# Patient Record
Sex: FEMALE | Race: WHITE | NOT HISPANIC OR LATINO | Employment: PART TIME | URBAN - METROPOLITAN AREA
[De-identification: names, ages, dates, MRNs, and addresses within clinical notes are randomized per-mention and may not be internally consistent; named-entity substitution may affect disease eponyms.]

---

## 2017-09-30 ENCOUNTER — HOSPITAL ENCOUNTER (EMERGENCY)
Facility: CLINIC | Age: 37
Discharge: HOME | End: 2017-10-01
Attending: EMERGENCY MEDICINE | Admitting: EMERGENCY MEDICINE
Payer: COMMERCIAL

## 2017-09-30 ENCOUNTER — APPOINTMENT (OUTPATIENT)
Dept: CT IMAGING | Facility: CLINIC | Age: 37
End: 2017-09-30
Attending: EMERGENCY MEDICINE
Payer: COMMERCIAL

## 2017-09-30 VITALS
OXYGEN SATURATION: 94 % | HEIGHT: 67 IN | RESPIRATION RATE: 18 BRPM | WEIGHT: 170 LBS | TEMPERATURE: 98.5 F | BODY MASS INDEX: 26.68 KG/M2 | SYSTOLIC BLOOD PRESSURE: 132 MMHG | DIASTOLIC BLOOD PRESSURE: 83 MMHG

## 2017-09-30 DIAGNOSIS — G43.819 OTHER MIGRAINE WITHOUT STATUS MIGRAINOSUS, INTRACTABLE: ICD-10-CM

## 2017-09-30 DIAGNOSIS — S16.1XXA CERVICAL STRAIN, INITIAL ENCOUNTER: ICD-10-CM

## 2017-09-30 PROBLEM — J45.909 ASTHMA: Status: ACTIVE | Noted: 2017-09-30

## 2017-09-30 PROBLEM — K59.03 CONSTIPATION DUE TO PAIN MEDICATION: Status: ACTIVE | Noted: 2017-09-30

## 2017-09-30 PROBLEM — F10.10 ALCOHOL ABUSE, EPISODIC: Status: ACTIVE | Noted: 2017-09-30

## 2017-09-30 PROBLEM — D72.829 LEUKOCYTOSIS: Status: ACTIVE | Noted: 2017-09-30

## 2017-09-30 PROBLEM — K86.1 CHRONIC PANCREATITIS (H): Status: ACTIVE | Noted: 2017-09-30

## 2017-09-30 PROBLEM — K85.20 ALCOHOL INDUCED ACUTE PANCREATITIS: Status: ACTIVE | Noted: 2017-04-26

## 2017-09-30 PROBLEM — M79.7 FIBROMYALGIA: Status: ACTIVE | Noted: 2017-04-26

## 2017-09-30 PROBLEM — F41.9 ANXIETY: Status: ACTIVE | Noted: 2017-04-26

## 2017-09-30 PROCEDURE — 25000128 H RX IP 250 OP 636

## 2017-09-30 PROCEDURE — 99284 EMERGENCY DEPT VISIT MOD MDM: CPT | Mod: 25

## 2017-09-30 PROCEDURE — 72125 CT NECK SPINE W/O DYE: CPT

## 2017-09-30 PROCEDURE — 96375 TX/PRO/DX INJ NEW DRUG ADDON: CPT

## 2017-09-30 PROCEDURE — 70450 CT HEAD/BRAIN W/O DYE: CPT

## 2017-09-30 PROCEDURE — 99284 EMERGENCY DEPT VISIT MOD MDM: CPT | Performed by: EMERGENCY MEDICINE

## 2017-09-30 PROCEDURE — 96361 HYDRATE IV INFUSION ADD-ON: CPT | Mod: 59

## 2017-09-30 PROCEDURE — 96374 THER/PROPH/DIAG INJ IV PUSH: CPT

## 2017-09-30 PROCEDURE — 25000128 H RX IP 250 OP 636: Performed by: EMERGENCY MEDICINE

## 2017-09-30 RX ORDER — ONDANSETRON 2 MG/ML
INJECTION INTRAMUSCULAR; INTRAVENOUS
Status: COMPLETED
Start: 2017-09-30 | End: 2017-09-30

## 2017-09-30 RX ORDER — KETOROLAC TROMETHAMINE 30 MG/ML
30 INJECTION, SOLUTION INTRAMUSCULAR; INTRAVENOUS ONCE
Status: COMPLETED | OUTPATIENT
Start: 2017-09-30 | End: 2017-09-30

## 2017-09-30 RX ORDER — ONDANSETRON 2 MG/ML
4 INJECTION INTRAMUSCULAR; INTRAVENOUS ONCE
Status: COMPLETED | OUTPATIENT
Start: 2017-09-30 | End: 2017-09-30

## 2017-09-30 RX ORDER — SODIUM CHLORIDE 9 MG/ML
1000 INJECTION, SOLUTION INTRAVENOUS CONTINUOUS
Status: DISCONTINUED | OUTPATIENT
Start: 2017-09-30 | End: 2017-10-01 | Stop reason: HOSPADM

## 2017-09-30 RX ORDER — DIPHENHYDRAMINE HYDROCHLORIDE 50 MG/ML
25 INJECTION INTRAMUSCULAR; INTRAVENOUS ONCE
Status: COMPLETED | OUTPATIENT
Start: 2017-09-30 | End: 2017-09-30

## 2017-09-30 RX ORDER — METOCLOPRAMIDE HYDROCHLORIDE 5 MG/ML
10 INJECTION INTRAMUSCULAR; INTRAVENOUS ONCE
Status: COMPLETED | OUTPATIENT
Start: 2017-09-30 | End: 2017-09-30

## 2017-09-30 RX ADMIN — DIPHENHYDRAMINE HYDROCHLORIDE 25 MG: 50 INJECTION, SOLUTION INTRAMUSCULAR; INTRAVENOUS at 22:34

## 2017-09-30 RX ADMIN — ONDANSETRON 4 MG: 2 INJECTION INTRAMUSCULAR; INTRAVENOUS at 22:34

## 2017-09-30 RX ADMIN — SODIUM CHLORIDE 1000 ML: 9 INJECTION, SOLUTION INTRAVENOUS at 22:33

## 2017-09-30 RX ADMIN — KETOROLAC TROMETHAMINE 30 MG: 30 INJECTION, SOLUTION INTRAMUSCULAR at 22:34

## 2017-09-30 RX ADMIN — METOCLOPRAMIDE 10 MG: 5 INJECTION, SOLUTION INTRAMUSCULAR; INTRAVENOUS at 22:34

## 2017-09-30 NOTE — ED AVS SNAPSHOT
[facility] Emergency Department    [address] [address]    Phone:  [phone]    Fax:  [phone]                                       Janel Calzada   MRN: [id number]    Department:  [facility] Emergency Department   Date of Visit:  9/30/2017           After Visit Summary Signature Page     I have received my discharge instructions, and my questions have been answered. I have discussed any challenges I see with this plan with the nurse or doctor.    ..........................................................................................................................................  Patient/Patient Representative Signature      ..........................................................................................................................................  Patient Representative Print Name and Relationship to Patient    ..................................................               ................................................  Date                                            Time    ..........................................................................................................................................  Reviewed by Signature/Title    ...................................................              ..............................................  Date                                                            Time
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
## 2017-10-01 NOTE — DISCHARGE INSTRUCTIONS
Understanding Cervical Strain    There are 7 bones (vertebrae) in the neck that are part of the spine. These are called the cervical spine. Cervical strain is a medical term for neck pain. The neck has several layers of muscles. These are connected with tendons to the cervical spine and other bones. Neck pain is often the result of injury to these muscles and tendons.  Causes of cervical strain  Different types of stress on the neck can damage muscles and tendons (soft tissues) and cause cervical strain. Cervical tissues can be damaged by:    The neck being forced past its normal range of motion, such as in a car accident or sports injury    Constant, low-level stress, such as from poor posture or a poorly set-up workspace  Symptoms of cervical strain  These may include:    Neck pain or stiffness    Pain in the shoulders or upper back    Muscle spasms    Headache, often starting at the base of the neck    Irritability, difficulty concentrating, or sleeplessness  Treatment for cervical strain  This problem often gets better on its own. Treatments aim to reduce pain and inflammation and increase the range of motion of the neck. Possible treatments include:    Over-the-counter or prescription pain medicine. These help relieve pain and inflammation.    Stretching exercises to decrease neck stiffness.    Massage to decrease neck stiffness.    Cold or heat pack. These help reduce pain and swelling.  Call 911  Call emergency services right away if you have any of these:    Face drooping or numbness    Numbness or weakness, especially in the arms or on one side    Slurred speech or difficulty speaking    Blurred vision   When to call your healthcare provider  Call your healthcare provider right away if you have any of these:    Fever of 100.4 F (38 C) or higher, or as directed    Pain or stiffness that gets worse    Symptoms that don t get better, or get worse    Numbness, tingling, weakness or shooting pains into the  arms or legs    New symptoms  Date Last Reviewed: 3/10/2016    3874-2962 [employer]. [address] [place] [address], [place] [address]. All rights reserved. This information is not intended as a substitute for professional medical care. Always follow your healthcare professional's instructions.          * Migraine Headache  Migraine headaches are related to changes in blood flow to the brain. This causes throbbing or constant pain on one or both sides of the head. The pain may last from a few hours to several days. There is usually nausea, vomiting, sensitivity to light and sound, and blurred vision. A migraine attack may be triggered by emotional stress, hormone changes during the menstrual cycle, oral contraceptives, alcohol use, certain foods containing tyramine, eye strain, weather changes, missing meals, or too little or too much sleep.  Home Care For This Headache:  1) If you were given pain medicine for this headache, do not drive yourself home . Arrange for a ride, instead. When you get home, try to sleep. You should feel much better when you wake up.  2) Migraine headaches may improve with an ice pack on the forehead or at the base of the skull. Heat to the back of your neck may relieve any neck spasm.  3) Drink only clear liquids or eat a very light diet to avoid nausea/vomiting until symptoms improve.  Preventing Future Headaches:  1) Pay attention to those factors that seem to trigger your headache. Try to avoid them when you can. If you have frequent headaches, it is useful to keep a diary of what you were doing, feeling or eating in the hours before each attack. Show this to your doctor to help find the cause of your headaches.  a) If you feel that stress is a factor in your headaches, look at the sources of stress in your life. Find ways to release the build-up of those stresses by using regular exercise, relaxation methods (yoga, meditation), bio-feedback or simply taking time-out for  yourself. For more information about this, consult your doctor or go to a local bookstore and review books and tapes on this subject.  b) Tyramine is a substance present in the following foods : chocolate, yogurt, all cheeses except cottage cheese and cream cheese. smoked or pickled fish and meat (including herring, caviar, bologna, pepperoni, salami), liver, avocados, bananas, figs, raisins, and red wine. Be aware that these foods may trigger a migraine in some persons. Try taking these foods out of your diet for 1-2 months to see if this reduces headache frequency.  Treating Future Attacks:  1) At the first sign of a migraine headache, take a medicine to stop it if one has been prescribed for you. If not, take acetaminophen (Tylenol) or ibuprofen (Motrin, Advil) if you are able to take these. The sooner you take medicine, the better it will work.  2) You may also want to find a quiet, dark, comfortable place to sit or lie down. Let yourself relax or sleep.  3) An ice pack on the forehead or area of greatest pain may also help.   Follow Up  with your doctor if the headache is not better within the next 24 hours. If you have frequent headaches you should discuss a treatment plan with your primary care doctor. Ask if you can have medicine to take at home the next time you get a bad headache. Poorly controlled chronic headaches may require a referral to a neurologist (headache specialist).  Get Prompt Medical Attention  if any of the following occur:    Your head pain gets worse, or does not improve within 24 hours    Repeated vomiting (can t keep liquids down)    Sinus or ear or throat pain (not already reported)    Fever of 101  F (38.3  C) or higher, or as directed by your healthcare provider    Stiff neck    Extreme drowsiness, confusion or fainting    Weakness of an arm or leg or one side of the face    Difficulty with speech or vision    8593-7308 [employer]. [address] [place] [address], [place]  [address]. All rights reserved. This information is not intended as a substitute for professional medical care. Always follow your healthcare professional's instructions.

## 2017-12-08 ENCOUNTER — AMBULATORY - HEALTHEAST (OUTPATIENT)
Dept: PALLIATIVE MEDICINE | Facility: OTHER | Age: 37
End: 2017-12-08

## 2017-12-08 DIAGNOSIS — G89.4 CHRONIC PAIN DISORDER: ICD-10-CM

## 2018-07-25 ENCOUNTER — APPOINTMENT (OUTPATIENT)
Dept: ULTRASOUND IMAGING | Facility: CLINIC | Age: 38
End: 2018-07-25
Attending: EMERGENCY MEDICINE
Payer: COMMERCIAL

## 2018-07-25 ENCOUNTER — HOSPITAL ENCOUNTER (EMERGENCY)
Facility: CLINIC | Age: 38
Discharge: HOME | End: 2018-07-25
Attending: EMERGENCY MEDICINE | Admitting: EMERGENCY MEDICINE
Payer: COMMERCIAL

## 2018-07-25 VITALS
RESPIRATION RATE: 16 BRPM | BODY MASS INDEX: 25.11 KG/M2 | SYSTOLIC BLOOD PRESSURE: 101 MMHG | HEART RATE: 62 BPM | OXYGEN SATURATION: 98 % | WEIGHT: 160 LBS | HEIGHT: 67 IN | DIASTOLIC BLOOD PRESSURE: 68 MMHG | TEMPERATURE: 99 F

## 2018-07-25 DIAGNOSIS — R10.30 ABDOMINAL PAIN, LOWER: ICD-10-CM

## 2018-07-25 DIAGNOSIS — N83.201 HEMORRHAGIC CYST OF RIGHT OVARY: ICD-10-CM

## 2018-07-25 LAB
ALBUMIN SERPL-MCNC: 4 G/DL (ref 3.4–5)
ALBUMIN UR-MCNC: NEGATIVE MG/DL
ALP SERPL-CCNC: 55 U/L (ref 40–150)
ALT SERPL W P-5'-P-CCNC: 27 U/L (ref 0–50)
ANION GAP SERPL CALCULATED.3IONS-SCNC: 4 MMOL/L (ref 3–14)
APPEARANCE UR: CLEAR
AST SERPL W P-5'-P-CCNC: 13 U/L (ref 0–45)
BASOPHILS # BLD AUTO: 0 10E9/L (ref 0–0.2)
BASOPHILS NFR BLD AUTO: 0.3 %
BILIRUB SERPL-MCNC: 0.3 MG/DL (ref 0.2–1.3)
BILIRUB UR QL STRIP: NEGATIVE
BUN SERPL-MCNC: 9 MG/DL (ref 7–30)
CALCIUM SERPL-MCNC: 9.1 MG/DL (ref 8.5–10.1)
CHLORIDE SERPL-SCNC: 108 MMOL/L (ref 94–109)
CO2 SERPL-SCNC: 26 MMOL/L (ref 20–32)
COLOR UR AUTO: YELLOW
CREAT SERPL-MCNC: 0.76 MG/DL (ref 0.52–1.04)
DIFFERENTIAL METHOD BLD: NORMAL
EOSINOPHIL # BLD AUTO: 0.1 10E9/L (ref 0–0.7)
EOSINOPHIL NFR BLD AUTO: 0.9 %
ERYTHROCYTE [DISTWIDTH] IN BLOOD BY AUTOMATED COUNT: 12.6 % (ref 10–15)
GFR SERPL CREATININE-BSD FRML MDRD: 86 ML/MIN/1.7M2
GLUCOSE SERPL-MCNC: 94 MG/DL (ref 70–99)
GLUCOSE UR STRIP-MCNC: NEGATIVE MG/DL
HCG SERPL QL: NEGATIVE
HCT VFR BLD AUTO: 38.6 % (ref 35–47)
HGB BLD-MCNC: 13.3 G/DL (ref 11.7–15.7)
HGB UR QL STRIP: NEGATIVE
IMM GRANULOCYTES # BLD: 0 10E9/L (ref 0–0.4)
IMM GRANULOCYTES NFR BLD: 0.2 %
KETONES UR STRIP-MCNC: NEGATIVE MG/DL
LEUKOCYTE ESTERASE UR QL STRIP: NEGATIVE
LYMPHOCYTES # BLD AUTO: 3.4 10E9/L (ref 0.8–5.3)
LYMPHOCYTES NFR BLD AUTO: 34.6 %
MCH RBC QN AUTO: 31.8 PG (ref 26.5–33)
MCHC RBC AUTO-ENTMCNC: 34.5 G/DL (ref 31.5–36.5)
MCV RBC AUTO: 92 FL (ref 78–100)
MONOCYTES # BLD AUTO: 0.7 10E9/L (ref 0–1.3)
MONOCYTES NFR BLD AUTO: 7.1 %
MUCOUS THREADS #/AREA URNS LPF: PRESENT /LPF
NEUTROPHILS # BLD AUTO: 5.6 10E9/L (ref 1.6–8.3)
NEUTROPHILS NFR BLD AUTO: 56.9 %
NITRATE UR QL: NEGATIVE
NRBC # BLD AUTO: 0 10*3/UL
NRBC BLD AUTO-RTO: 0 /100
PH UR STRIP: 6 PH (ref 5–7)
PLATELET # BLD AUTO: 235 10E9/L (ref 150–450)
POTASSIUM SERPL-SCNC: 4 MMOL/L (ref 3.4–5.3)
PROT SERPL-MCNC: 7.2 G/DL (ref 6.8–8.8)
RBC # BLD AUTO: 4.18 10E12/L (ref 3.8–5.2)
RBC #/AREA URNS AUTO: <1 /HPF (ref 0–2)
SODIUM SERPL-SCNC: 138 MMOL/L (ref 133–144)
SOURCE: ABNORMAL
SP GR UR STRIP: 1.01 (ref 1–1.03)
SQUAMOUS #/AREA URNS AUTO: <1 /HPF (ref 0–1)
UROBILINOGEN UR STRIP-MCNC: 0 MG/DL (ref 0–2)
WBC # BLD AUTO: 9.8 10E9/L (ref 4–11)
WBC #/AREA URNS AUTO: <1 /HPF (ref 0–5)

## 2018-07-25 PROCEDURE — 99285 EMERGENCY DEPT VISIT HI MDM: CPT | Mod: Z6 | Performed by: EMERGENCY MEDICINE

## 2018-07-25 PROCEDURE — 93976 VASCULAR STUDY: CPT

## 2018-07-25 PROCEDURE — 84703 CHORIONIC GONADOTROPIN ASSAY: CPT | Performed by: EMERGENCY MEDICINE

## 2018-07-25 PROCEDURE — 99285 EMERGENCY DEPT VISIT HI MDM: CPT | Mod: 25 | Performed by: EMERGENCY MEDICINE

## 2018-07-25 PROCEDURE — 96374 THER/PROPH/DIAG INJ IV PUSH: CPT | Performed by: EMERGENCY MEDICINE

## 2018-07-25 PROCEDURE — 81001 URINALYSIS AUTO W/SCOPE: CPT | Performed by: EMERGENCY MEDICINE

## 2018-07-25 PROCEDURE — 85025 COMPLETE CBC W/AUTO DIFF WBC: CPT | Performed by: EMERGENCY MEDICINE

## 2018-07-25 PROCEDURE — 25000128 H RX IP 250 OP 636: Performed by: EMERGENCY MEDICINE

## 2018-07-25 PROCEDURE — 80053 COMPREHEN METABOLIC PANEL: CPT | Performed by: EMERGENCY MEDICINE

## 2018-07-25 PROCEDURE — 96375 TX/PRO/DX INJ NEW DRUG ADDON: CPT | Performed by: EMERGENCY MEDICINE

## 2018-07-25 PROCEDURE — 25000132 ZZH RX MED GY IP 250 OP 250 PS 637: Performed by: EMERGENCY MEDICINE

## 2018-07-25 RX ORDER — OXYCODONE AND ACETAMINOPHEN 5; 325 MG/1; MG/1
2 TABLET ORAL ONCE
Status: COMPLETED | OUTPATIENT
Start: 2018-07-25 | End: 2018-07-25

## 2018-07-25 RX ORDER — KETOROLAC TROMETHAMINE 30 MG/ML
30 INJECTION, SOLUTION INTRAMUSCULAR; INTRAVENOUS ONCE
Status: COMPLETED | OUTPATIENT
Start: 2018-07-25 | End: 2018-07-25

## 2018-07-25 RX ORDER — OXYCODONE AND ACETAMINOPHEN 5; 325 MG/1; MG/1
1-2 TABLET ORAL EVERY 4 HOURS PRN
Qty: 12 TABLET | Refills: 0 | Status: SHIPPED | OUTPATIENT
Start: 2018-07-25 | End: 2019-07-26

## 2018-07-25 RX ORDER — HYDROMORPHONE HYDROCHLORIDE 1 MG/ML
0.5 INJECTION, SOLUTION INTRAMUSCULAR; INTRAVENOUS; SUBCUTANEOUS ONCE
Status: COMPLETED | OUTPATIENT
Start: 2018-07-25 | End: 2018-07-25

## 2018-07-25 RX ADMIN — KETOROLAC TROMETHAMINE 30 MG: 30 INJECTION, SOLUTION INTRAMUSCULAR at 19:17

## 2018-07-25 RX ADMIN — OXYCODONE HYDROCHLORIDE AND ACETAMINOPHEN 2 TABLET: 5; 325 TABLET ORAL at 21:23

## 2018-07-25 RX ADMIN — Medication 0.5 MG: at 19:38

## 2018-07-25 NOTE — ED AVS SNAPSHOT
[facility]    [address] [facility] [place] [place] [address]    Phone:  [phone]    Fax:  [phone]                                       Janel Calzada   MRN: [id number]    Department:  [facility]   Date of Visit:  7/25/2018           Patient Information     Date Of Birth          1980        Your diagnoses for this visit were:     Hemorrhagic cyst of right ovary     Abdominal pain, lower        You were seen by Aric Marie MD.      Follow-up Information     Schedule an appointment as soon as possible for a visit with Robina Borrero.    Specialty:  Family Practice    Why:  For re-check later this week    Contact information:    [facility]  [address] [facility] [address]  [phone]          Discharge Instructions         Understanding Ovarian Cysts  An ovarian cyst is a fluid-filled sac that forms on or inside an ovary. The ovaries are a pair of small, oval-shaped organs in the lower part of a woman s belly (abdomen). About once a month, one of the ovaries releases an egg. The ovaries also make the hormones estrogen and progesterone. These hormones are part of pregnancy, the menstrual cycle, and breast growth.  Ovarian cysts are very common in women of all ages. Young girls can also get them, but this is less common. There are different types of ovarian cysts. They can occur for various reasons, and they may need different treatments. A cyst can vary in size from half an inch to more than 4 inches.  Types of ovarian cysts  There are different types of ovarian cysts:  Functional cyst  This is the most common type of ovarian cyst. They only occur in women who haven t gone through menopause. There are 2 types of functional cysts:    Follicular cyst. This cyst happens when an egg isn t released and it keeps growing inside the ovary.    Corpus luteum cyst. This type of cyst occurs when the sac around the egg doesn t dissolve  after the egg is released.  Endometrioma  This is a cyst filled with old blood and tissue from the lining of the uterus. They are often called chocolate cysts because of their dark color. They can happen in women with endometriosis.  Dermoid cyst  This cyst develops from ovarian cells and eggs. They may have hair, skin, or fat in them. These cysts are common in women of childbearing age.  What causes ovarian cysts?  Cysts can also be caused by:    Polycystic ovary syndrome (PCOS), a condition that causes multiple cysts on the ovaries    Pregnancy    Severe pelvic infection, such as chlamydia    Noncancerous growths    Cancer (rare)    Using fertility medicine to cause ovulation, such as clomiphene  Symptoms of an ovarian cyst  Many women don t have any symptoms from the cyst. In women with symptoms, the most common is pain or pressure in your lower belly on the side of the cyst. This pain may be dull or sharp, and it may come and go. A cyst that breaks open (ruptures) may lead to sudden, sharp pain.  Other symptoms of an ovarian cyst can include:    Pain in the lower back or thighs    Trouble emptying your bladder fully    Pain during sex    Weight gain    Pain during your period    Breast tenderness    Abnormal vaginal bleeding (rare)  Diagnosing an ovarian cyst  Your primary care doctor or an obstetrics and gynecology (OB/GYN) doctor may diagnose the condition. Your doctor will ask about your health history and your symptoms. You will also have a physical exam. This will likely include a pelvic exam. During the pelvic exam, your doctor may feel the swelling on your ovary. In women with no symptoms, this is often the first sign of a cyst.  If your doctor thinks you may have an ovarian cyst, you may need tests. These can help your doctor learn the type of cyst. Tests can also help rule out other problems, such as an ectopic pregnancy. The tests may include:    Ultrasound. This test uses sound waves to view the size,  shape, and location of the cyst. The test can also show if the growth is solid or filled with fluid.    MRI. This uses large magnets and a computer to create a detailed picture of the area.    Pregnancy test. This is done to check if pregnancy may be the cause of the cyst.    Blood tests. These check for hormone problems and cancer. They also check if the cyst is bleeding.    Biopsy. This is a test where a tiny piece of the ovary is taken. The piece is examined in a lab for cancer cells. This may be done if an ultrasound shows a certain type of growth on the ovary.  Date Last Reviewed: 8/1/2017 2000-2017 [employer]. [address] [place] [address]. All rights reserved. This information is not intended as a substitute for professional medical care. Always follow your healthcare professional's instructions.          Ovarian Cysts    The ovaries are two small organs located on each side of a woman s uterus (womb). They are part of the female reproductive system. Ovarian cysts are sacs filled with fluid or tissue that form on or inside the ovaries.  Ovarian cysts are common in women, especially during childbearing years. There are different types of cysts. Most are harmless (benign) and go away on their own. They often cause no symptoms. If symptoms do occur, they can include mild pain or pressure in the lower belly (abdomen).  Cysts that are large or break (rupture) may cause more severe pain and symptoms. In these cases, you may need hospital care or treatment such as surgery. You may need more extensive treatment if a cyst causes an ovary to twist (called torsion) or if your doctor suspects your cyst is cancerous. Keep in mind that most cysts are not cancerous, however.  General care    To help relieve pain, your healthcare provider may recommend using over-the-counter pain medicine. If needed, your provide may prescribe stronger pain medicine.    Depending on the type of cyst you have,  your healthcare provider may advise taking birth control pills. These help shrink cysts in certain cases. They may also help prevent new cysts from forming. Be sure to take these medicines as directed if they are prescribed.    Your healthcare provider may advise you to watch your symptoms over time to see if they go away or worsen. Regular ultrasound tests may also be advised. These can help check if a cyst goes away or grows in size.  Follow-up care  Follow up with your healthcare provider, or as advised.  When to seek medical advice  Call your healthcare provider right away if any of these occur:    Pain worsens or fails to get better with home treatment    Fever of 100.4 F (38 C) or higher (or other fever amount directed by your healthcare provider)    Nausea and vomiting    Weakness, dizziness, or fainting    Abnormal vaginal bleeding  Date Last Reviewed: 11/1/2017 2000-2017 [employer]. [address] [place] [address]. All rights reserved. This information is not intended as a substitute for professional medical care. Always follow your healthcare professional's instructions.          24 Hour Appointment Hotline       To make an appointment at any [facility], call [phone] ([phone]). If you don't have a family doctor or clinic, we will help you find one. [place] clinics are conveniently located to serve the needs of you and your family.             Review of your medicines      START taking        Dose / Directions Last dose taken    oxyCODONE-acetaminophen 5-325 MG per tablet   Commonly known as:  PERCOCET   Dose:  1-2 tablet   Quantity:  12 tablet        Take 1-2 tablets by mouth every 4 hours as needed for moderate to severe pain   Refills:  0          Our records show that you are taking the medicines listed below. If these are incorrect, please call your family doctor or clinic.        Dose / Directions Last dose taken    ADDERALL PO        Refills:  0         GABAPENTIN PO        Refills:  0        PROZAC PO        Refills:  0                Information about OPIOIDS     PRESCRIPTION OPIOIDS: WHAT YOU NEED TO KNOW   We gave you an opioid (narcotic) pain medicine. It is important to manage your pain, but opioids are not always the best choice. You should first try all the other options your care team gave you. Take this medicine for as short a time (and as few doses) as possible.     These medicines have risks:    DO NOT drive when on new or higher doses of pain medicine. These medicines can affect your alertness and reaction times, and you could be arrested for driving under the influence (DUI). If you need to use opioids long-term, talk to your care team about driving.    DO NOT operate heave machinery    DO NOT do any other dangerous activities while taking these medicines.     DO NOT drink any alcohol while taking these medicines.      If the opioid prescribed includes acetaminophen, DO NOT take with any other medicines that contain acetaminophen. Read all labels carefully. Look for the word  acetaminophen  or  Tylenol.  Ask your pharmacist if you have questions or are unsure.    You can get addicted to pain medicines, especially if you have a history of addiction (chemical, alcohol or substance dependence). Talk to your care team about ways to reduce this risk.    Store your pills in a secure place, locked if possible. We will not replace any lost or stolen medicine. If you don t finish your medicine, please throw away (dispose) as directed by your pharmacist. The [place] [employer]ution [employer] Agency has more information about safe disposal: https://www.pca.[facility].[place].us/living-green/managing-unwanted-medications.     All opioids tend to cause constipation. Drink plenty of water and eat foods that have a lot of fiber, such as fruits, vegetables, prune juice, apple juice and high-fiber cereal. Take a laxative (Miralax, milk of magnesia, Colace, Senna) if you don t move  your bowels at least every other day.         Prescriptions were sent or printed at these locations (1 Prescription)                   Other Prescriptions                Printed at Department/Unit printer (1 of 1)         oxyCODONE-acetaminophen (PERCOCET) 5-325 MG per tablet                Procedures and tests performed during your visit     CBC with platelets, differential    Comprehensive metabolic panel    HCG qualitative pregnancy (blood)    Pelvic Ultrasound (US) with doppler imaging (r/o ovarian torsion)    UA with Microscopic      Orders Needing Specimen Collection     None      Pending Results     No orders found from 7/23/2018 to 7/26/2018.            Pending Culture Results     No orders found from 7/23/2018 to 7/26/2018.            Pending Results Instructions     If you had any lab results that were not finalized at the time of your Discharge, you can call the ED Lab Result RN at [phone]. You will be contacted by this team for any positive Lab results or changes in treatment. The nurses are available 7 days a week from 10A to 6:30P.  You can leave a message 24 hours per day and they will return your call.        Test Results From Your Hospital Stay        7/25/2018  7:50 PM      Component Results     Component Value Ref Range & Units Status    Sodium 138 133 - 144 mmol/L Final    Potassium 4.0 3.4 - 5.3 mmol/L Final    Chloride 108 94 - 109 mmol/L Final    Carbon Dioxide 26 20 - 32 mmol/L Final    Anion Gap 4 3 - 14 mmol/L Final    Glucose 94 70 - 99 mg/dL Final    Urea Nitrogen 9 7 - 30 mg/dL Final    Creatinine 0.76 0.52 - 1.04 mg/dL Final    GFR Estimate 86 >60 mL/min/1.7m2 Final    Non  GFR Calc    GFR Estimate If Black >90 >60 mL/min/1.7m2 Final    African American GFR Calc    Calcium 9.1 8.5 - 10.1 mg/dL Final    Bilirubin Total 0.3 0.2 - 1.3 mg/dL Final    Albumin 4.0 3.4 - 5.0 g/dL Final    Protein Total 7.2 6.8 - 8.8 g/dL Final    Alkaline Phosphatase 55 40 - 150 U/L Final     ALT 27 0 - 50 U/L Final    AST 13 0 - 45 U/L Final         7/25/2018  7:33 PM      Component Results     Component Value Ref Range & Units Status    WBC 9.8 4.0 - 11.0 10e9/L Final    RBC Count 4.18 3.8 - 5.2 10e12/L Final    Hemoglobin 13.3 11.7 - 15.7 g/dL Final    Hematocrit 38.6 35.0 - 47.0 % Final    MCV 92 78 - 100 fl Final    MCH 31.8 26.5 - 33.0 pg Final    MCHC 34.5 31.5 - 36.5 g/dL Final    RDW 12.6 10.0 - 15.0 % Final    Platelet Count 235 150 - 450 10e9/L Final    Diff Method Automated Method  Final    % Neutrophils 56.9 % Final    % Lymphocytes 34.6 % Final    % Monocytes 7.1 % Final    % Eosinophils 0.9 % Final    % Basophils 0.3 % Final    % Immature Granulocytes 0.2 % Final    Nucleated RBCs 0 0 /100 Final    Absolute Neutrophil 5.6 1.6 - 8.3 10e9/L Final    Absolute Lymphocytes 3.4 0.8 - 5.3 10e9/L Final    Absolute Monocytes 0.7 0.0 - 1.3 10e9/L Final    Absolute Eosinophils 0.1 0.0 - 0.7 10e9/L Final    Absolute Basophils 0.0 0.0 - 0.2 10e9/L Final    Abs Immature Granulocytes 0.0 0 - 0.4 10e9/L Final    Absolute Nucleated RBC 0.0  Final         7/25/2018  8:39 PM      Component Results     Component Value Ref Range & Units Status    Color Urine Yellow  Final    Appearance Urine Clear  Final    Glucose Urine Negative NEG^Negative mg/dL Final    Bilirubin Urine Negative NEG^Negative Final    Ketones Urine Negative NEG^Negative mg/dL Final    Specific Gravity Urine 1.012 1.003 - 1.035 Final    Blood Urine Negative NEG^Negative Final    pH Urine 6.0 5.0 - 7.0 pH Final    Protein Albumin Urine Negative NEG^Negative mg/dL Final    Urobilinogen mg/dL 0.0 0.0 - 2.0 mg/dL Final    Nitrite Urine Negative NEG^Negative Final    Leukocyte Esterase Urine Negative NEG^Negative Final    Source Midstream Urine  Final    WBC Urine <1 0 - 5 /HPF Final    RBC Urine <1 0 - 2 /HPF Final    Squamous Epithelial /HPF Urine <1 0 - 1 /HPF Final    Mucous Urine Present (A) NEG^Negative /LPF Final         7/25/2018  8:47  PM      Narrative     US PELVIS COMPLETE WITH TRANSVAGINAL AND DOPPLER LIMITED 7/25/2018  8:17 PM     HISTORY: Severe pelvic pain, status post hysterectomy.      FINDINGS: Transvaginal images were performed to better evaluate the  patient's uterus, ovaries and endometrial stripe.    The uterus is surgically absent. The right ovary measures 4.4 x 3.0 x  3.1 cm and contains a complex appearing cystic lesion measuring 3.8 x  2.7 x 2.5 cm. No associated color Doppler flow. This is most likely a  hemorrhagic cyst. The left ovary measures 3.3 x 2.3 x 3.0 cm and  contains a dominant follicle measuring 2.2 cm. Color Doppler waveform  analysis demonstrates normal arterial and venous waveforms within each  ovary. No adnexal masses are present. No free pelvic fluid is present.        Impression     IMPRESSION: No ultrasound findings to suggest ovarian torsion.  Dominant follicle left ovary and probable hemorrhagic cyst right ovary  measuring 3.8 cm. Follow-up ultrasound in 2 or 3 months could be  performed to assess for resolution. No free pelvic fluid. Prior  hysterectomy.    KESHIA HENDERSON MD         7/25/2018  9:13 PM      Component Results     Component Value Ref Range & Units Status    HCG Qualitative Serum Negative NEG^Negative Final    This test is for screening purposes.  Results should be interpreted along with   the clinical picture.  Confirmation testing is available if warranted by   ordering FYA399, HCG Quantitative Pregnancy.                  Thank you for choosing [facility]       Thank you for choosing [facility] for your care. Our goal is always to provide you with excellent care. Hearing back from our patients is one way we can continue to improve our services. Please take a few minutes to complete the written survey that you may receive in the mail after you visit with us. Thank you!        [employer] Information Digital Information     [employer] lets you send messages to your doctor, view your test results, renew your prescriptions,  "schedule appointments and more. To sign up, go to [email or web address] . Click on \"Log in\" on the left side of the screen, which will take you to the Welcome page. Then click on \"Sign up Now\" on the right side of the page.     You will be asked to enter the access code listed below, as well as some personal information. Please follow the directions to create your username and password.     Your access code is: [email or web address]  Expires: 10/23/2018  8:58 PM     Your access code will  in 90 days. If you need help or a new code, please call your [facility] clinic or [phone].        Care EveryWhere ID     This is your Care EveryWhere ID. This could be used by other organizations to access your [facility] medical records  [id number]        Equal Access to Services     JORDAN JANE : Camilo Huynh, renay coelho, maria m valdes, fco truong . So [facility] [phone].    ATENCIÓN: Si habla español, tiene a contreras disposición servicios gratuitos de asistencia lingüística. Llame al [phone].    We comply with applicable federal civil rights laws and [place] laws. We do not discriminate on the basis of race, color, national origin, age, disability, sex, sexual orientation, or gender identity.            After Visit Summary       This is your record. Keep this with you and show to your community pharmacist(s) and doctor(s) at your next visit.                  "

## 2018-07-25 NOTE — ED TRIAGE NOTES
Pt arrives by ambulance with lower abdominal pain that began over one month ago. Hysterectomy 3 years ago. H/O a left ovary cyst that was treated with oral pain medication. Pain is similar but feels much worse, pressure is present..Recieved 25 mcg Fentanyl from EMS without relief.

## 2018-07-25 NOTE — ED AVS SNAPSHOT
[facility] Emergency Department    [address] [address]    Phone:  [phone]    Fax:  [phone]                                       Janel Calzada   MRN: [id number]    Department:  [facility] Emergency Department   Date of Visit:  7/25/2018           After Visit Summary Signature Page     I have received my discharge instructions, and my questions have been answered. I have discussed any challenges I see with this plan with the nurse or doctor.    ..........................................................................................................................................  Patient/Patient Representative Signature      ..........................................................................................................................................  Patient Representative Print Name and Relationship to Patient    ..................................................               ................................................  Date                                            Time    ..........................................................................................................................................  Reviewed by Signature/Title    ...................................................              ..............................................  Date                                                            Time

## 2018-07-26 NOTE — ED PROVIDER NOTES
History     Chief Complaint   Patient presents with     Abdominal Pain     HPI  Janel Calzada is a 37 year old female with history of hysterectomy and left ovarian cyst in 2017 with over 1 month of lower abdominal/pelvic pain who presents by EMS for evaluation.  Pain is refractory to fentanyl 25 mcg IV given by EMS. Insidious onset of sharp and cramping pain approximately 1 month ago. Pain is worse in the past week and has been severe, constant, radiating throughout the lower abd and pelvis and poorly localized.  No relief with OTC analgesic.  No documented fever, but has felt feverish.  No UTI signs or symptoms or hematuria.  No back or flank pain.  No constipation or diarrhea.  No vaginal bleeding or discharge.    Problem List:    Patient Active Problem List    Diagnosis Date Noted     Alcohol abuse, episodic 09/30/2017     Priority: Medium     Overview:   Created by Conversion       Asthma 09/30/2017     Priority: Medium     Overview:   Created by Conversion       Chronic pancreatitis (H) 09/30/2017     Priority: Medium     Overview:   Created by Conversion       Constipation due to pain medication 09/30/2017     Priority: Medium     Leukocytosis 09/30/2017     Priority: Medium     Alcohol induced acute pancreatitis 04/26/2017     Priority: Medium     Anxiety 04/26/2017     Priority: Medium     Fibromyalgia 04/26/2017     Priority: Medium     ADD (attention deficit disorder) 10/29/2016     Priority: Medium     Bradycardia 10/22/2016     Priority: Medium     Alcohol dependence with alcohol-induced anxiety disorder (H) 10/21/2016     Priority: Medium     Acute diverticulitis 05/21/2016     Priority: Medium     Diverticulitis 05/20/2016     Priority: Medium     Sigmoid diverticulitis 05/20/2016     Priority: Medium     S/P hysterectomy 03/04/2016     Priority: Medium     GERD (gastroesophageal reflux disease) 01/04/2015     Priority: Medium     Depression 07/16/2014     Priority: Medium     Acute pancreatitis  "07/14/2014     Priority: Medium        Past Medical History:    Past Medical History:   Diagnosis Date     ADHD      Allergic state      Anxiety      Cancer (H)      Depressive disorder        Past Surgical History:    Past Surgical History:   Procedure Laterality Date     GYN SURGERY       HYSTERECTOMY         Family History:    No family history on file.    Social History:  Marital Status:  Single [1]  Social History   Substance Use Topics     Smoking status: Current Every Day Smoker     Packs/day: 1.00     Years: 16.00     Types: Cigarettes     Smokeless tobacco: Never Used     Alcohol use No        Medications:      oxyCODONE-acetaminophen (PERCOCET) 5-325 MG per tablet   Amphetamine-Dextroamphetamine (ADDERALL PO)   FLUoxetine HCl (PROZAC PO)   GABAPENTIN PO         Review of Systems  As mentioned above in the history present illness.  All other systems were reviewed and are negative.    Physical Exam   BP: 98/72  Pulse: 62  Temp: 99  F (37.2  C)  Resp: 16  Height: 170.2 cm (5' 7\")  Weight: 72.6 kg (160 lb)  SpO2: 97 %      Physical Exam   Constitutional: She is oriented to person, place, and time. She appears well-developed and well-nourished. She appears distressed.   HENT:   Head: Normocephalic and atraumatic.   Mouth/Throat: Oropharynx is clear and moist.   Eyes: Conjunctivae and EOM are normal. No scleral icterus.   Neck: Normal range of motion. Neck supple. No tracheal deviation present.   Cardiovascular: Normal rate, regular rhythm and normal heart sounds.    Pulmonary/Chest: Effort normal and breath sounds normal. No respiratory distress.   Abdominal: Soft. Normal appearance and bowel sounds are normal. She exhibits no distension, no abdominal bruit and no pulsatile midline mass. There is no hepatosplenomegaly. There is no tenderness. There is no rigidity, no rebound, no guarding and no CVA tenderness.       Musculoskeletal: Normal range of motion. She exhibits no edema.   Neurological: She is alert and " oriented to person, place, and time.   Skin: Skin is warm and dry. No rash noted. She is not diaphoretic. No erythema. No pallor.   Psychiatric: Her behavior is normal.   Anxious affect.   Nursing note and vitals reviewed.      ED Course     ED Course     Procedures                 Results for orders placed or performed during the hospital encounter of 07/25/18 (from the past 24 hour(s))   Comprehensive metabolic panel   Result Value Ref Range    Sodium 138 133 - 144 mmol/L    Potassium 4.0 3.4 - 5.3 mmol/L    Chloride 108 94 - 109 mmol/L    Carbon Dioxide 26 20 - 32 mmol/L    Anion Gap 4 3 - 14 mmol/L    Glucose 94 70 - 99 mg/dL    Urea Nitrogen 9 7 - 30 mg/dL    Creatinine 0.76 0.52 - 1.04 mg/dL    GFR Estimate 86 >60 mL/min/1.7m2    GFR Estimate If Black >90 >60 mL/min/1.7m2    Calcium 9.1 8.5 - 10.1 mg/dL    Bilirubin Total 0.3 0.2 - 1.3 mg/dL    Albumin 4.0 3.4 - 5.0 g/dL    Protein Total 7.2 6.8 - 8.8 g/dL    Alkaline Phosphatase 55 40 - 150 U/L    ALT 27 0 - 50 U/L    AST 13 0 - 45 U/L   CBC with platelets, differential   Result Value Ref Range    WBC 9.8 4.0 - 11.0 10e9/L    RBC Count 4.18 3.8 - 5.2 10e12/L    Hemoglobin 13.3 11.7 - 15.7 g/dL    Hematocrit 38.6 35.0 - 47.0 %    MCV 92 78 - 100 fl    MCH 31.8 26.5 - 33.0 pg    MCHC 34.5 31.5 - 36.5 g/dL    RDW 12.6 10.0 - 15.0 %    Platelet Count 235 150 - 450 10e9/L    Diff Method Automated Method     % Neutrophils 56.9 %    % Lymphocytes 34.6 %    % Monocytes 7.1 %    % Eosinophils 0.9 %    % Basophils 0.3 %    % Immature Granulocytes 0.2 %    Nucleated RBCs 0 0 /100    Absolute Neutrophil 5.6 1.6 - 8.3 10e9/L    Absolute Lymphocytes 3.4 0.8 - 5.3 10e9/L    Absolute Monocytes 0.7 0.0 - 1.3 10e9/L    Absolute Eosinophils 0.1 0.0 - 0.7 10e9/L    Absolute Basophils 0.0 0.0 - 0.2 10e9/L    Abs Immature Granulocytes 0.0 0 - 0.4 10e9/L    Absolute Nucleated RBC 0.0    UA with Microscopic   Result Value Ref Range    Color Urine Yellow     Appearance Urine Clear      Glucose Urine Negative NEG^Negative mg/dL    Bilirubin Urine Negative NEG^Negative    Ketones Urine Negative NEG^Negative mg/dL    Specific Gravity Urine 1.012 1.003 - 1.035    Blood Urine Negative NEG^Negative    pH Urine 6.0 5.0 - 7.0 pH    Protein Albumin Urine Negative NEG^Negative mg/dL    Urobilinogen mg/dL 0.0 0.0 - 2.0 mg/dL    Nitrite Urine Negative NEG^Negative    Leukocyte Esterase Urine Negative NEG^Negative    Source Midstream Urine     WBC Urine <1 0 - 5 /HPF    RBC Urine <1 0 - 2 /HPF    Squamous Epithelial /HPF Urine <1 0 - 1 /HPF    Mucous Urine Present (A) NEG^Negative /LPF   Pelvic Ultrasound (US) with doppler imaging (r/o ovarian torsion)    Narrative    US PELVIS COMPLETE WITH TRANSVAGINAL AND DOPPLER LIMITED 7/25/2018  8:17 PM     HISTORY: Severe pelvic pain, status post hysterectomy.      FINDINGS: Transvaginal images were performed to better evaluate the  patient's uterus, ovaries and endometrial stripe.    The uterus is surgically absent. The right ovary measures 4.4 x 3.0 x  3.1 cm and contains a complex appearing cystic lesion measuring 3.8 x  2.7 x 2.5 cm. No associated color Doppler flow. This is most likely a  hemorrhagic cyst. The left ovary measures 3.3 x 2.3 x 3.0 cm and  contains a dominant follicle measuring 2.2 cm. Color Doppler waveform  analysis demonstrates normal arterial and venous waveforms within each  ovary. No adnexal masses are present. No free pelvic fluid is present.      Impression    IMPRESSION: No ultrasound findings to suggest ovarian torsion.  Dominant follicle left ovary and probable hemorrhagic cyst right ovary  measuring 3.8 cm. Follow-up ultrasound in 2 or 3 months could be  performed to assess for resolution. No free pelvic fluid. Prior  hysterectomy.    KESHIA HENDERSON MD       Medications   oxyCODONE-acetaminophen (PERCOCET) 5-325 MG per tablet 2 tablet (not administered)   ketorolac (TORADOL) injection 30 mg (30 mg Intravenous Given 7/25/18 1917)    HYDROmorphone (PF) (DILAUDID) injection 0.5 mg (0.5 mg Intravenous Given 7/25/18 1938)     Ultrasound technician reports that the patient tolerated the ultrasound well and without any significant discomfort but when she brought the patient back to the room and her mother asked her how her pain when she became atraumatic and complained of severe pain again.  Patient reports that during the ultrasound examination the pain was primarily on the left side.    We discussed the laboratory and ultrasound results and differential diagnoses.  Discussed performing a CT study for further evaluation and discussed risks and benefits of this.  He has had numerous CT studies in the past and she and her mother elected to defer CT scanning, which appears clinically appropriate and acceptable at the present time.    Assessments & Plan (with Medical Decision Making)   Patient with history of hysterectomy and prior left ovarian cyst with ~ 1 month of pelvic pain which is worsened in the past week and was found to have a 3.8 cm right ovarian cyst which appears hemorrhagic.  This appears to be the cause of her pain and I doubt emergent GI/'s disease process.  We discussed performing a CT study for further evaluation of other possible causes of pain, including acute appendicitis and she elected to defer this as she has had previous CT studies and wish to avoid the risk of radiation exposure.  She will be discharged with Percocet X 12 tablets to use for pain refractory to NSAID.  I recommend she recheck check in clinic later this week, and discuss getting an outpatient pelvic ultrasound for reevaluation in 2-3 months. Patient was provided instructions for supportive care and will return as needed for worsened condition or worsening symptoms, or new problems or concerns.      I have reviewed the nursing notes.    I have reviewed the findings, diagnosis, plan and need for follow up with the patient.      New Prescriptions     OXYCODONE-ACETAMINOPHEN (PERCOCET) 5-325 MG PER TABLET    Take 1-2 tablets by mouth every 4 hours as needed for moderate to severe pain       Final diagnoses:   Hemorrhagic cyst of right ovary       7/25/2018   [facility] EMERGENCY DEPARTMENT     Aric Marie MD  07/25/18 2539

## 2018-07-26 NOTE — DISCHARGE INSTRUCTIONS
Understanding Ovarian Cysts  An ovarian cyst is a fluid-filled sac that forms on or inside an ovary. The ovaries are a pair of small, oval-shaped organs in the lower part of a woman s belly (abdomen). About once a month, one of the ovaries releases an egg. The ovaries also make the hormones estrogen and progesterone. These hormones are part of pregnancy, the menstrual cycle, and breast growth.  Ovarian cysts are very common in women of all ages. Young girls can also get them, but this is less common. There are different types of ovarian cysts. They can occur for various reasons, and they may need different treatments. A cyst can vary in size from half an inch to more than 4 inches.  Types of ovarian cysts  There are different types of ovarian cysts:  Functional cyst  This is the most common type of ovarian cyst. They only occur in women who haven t gone through menopause. There are 2 types of functional cysts:    Follicular cyst. This cyst happens when an egg isn t released and it keeps growing inside the ovary.    Corpus luteum cyst. This type of cyst occurs when the sac around the egg doesn t dissolve after the egg is released.  Endometrioma  This is a cyst filled with old blood and tissue from the lining of the uterus. They are often called chocolate cysts because of their dark color. They can happen in women with endometriosis.  Dermoid cyst  This cyst develops from ovarian cells and eggs. They may have hair, skin, or fat in them. These cysts are common in women of childbearing age.  What causes ovarian cysts?  Cysts can also be caused by:    Polycystic ovary syndrome (PCOS), a condition that causes multiple cysts on the ovaries    Pregnancy    Severe pelvic infection, such as chlamydia    Noncancerous growths    Cancer (rare)    Using fertility medicine to cause ovulation, such as clomiphene  Symptoms of an ovarian cyst  Many women don t have any symptoms from the cyst. In women with symptoms, the most common  is pain or pressure in your lower belly on the side of the cyst. This pain may be dull or sharp, and it may come and go. A cyst that breaks open (ruptures) may lead to sudden, sharp pain.  Other symptoms of an ovarian cyst can include:    Pain in the lower back or thighs    Trouble emptying your bladder fully    Pain during sex    Weight gain    Pain during your period    Breast tenderness    Abnormal vaginal bleeding (rare)  Diagnosing an ovarian cyst  Your primary care doctor or an obstetrics and gynecology (OB/GYN) doctor may diagnose the condition. Your doctor will ask about your health history and your symptoms. You will also have a physical exam. This will likely include a pelvic exam. During the pelvic exam, your doctor may feel the swelling on your ovary. In women with no symptoms, this is often the first sign of a cyst.  If your doctor thinks you may have an ovarian cyst, you may need tests. These can help your doctor learn the type of cyst. Tests can also help rule out other problems, such as an ectopic pregnancy. The tests may include:    Ultrasound. This test uses sound waves to view the size, shape, and location of the cyst. The test can also show if the growth is solid or filled with fluid.    MRI. This uses large magnets and a computer to create a detailed picture of the area.    Pregnancy test. This is done to check if pregnancy may be the cause of the cyst.    Blood tests. These check for hormone problems and cancer. They also check if the cyst is bleeding.    Biopsy. This is a test where a tiny piece of the ovary is taken. The piece is examined in a lab for cancer cells. This may be done if an ultrasound shows a certain type of growth on the ovary.  Date Last Reviewed: 8/1/2017 2000-2017 [employer]. [address] [place] [address], [place] [address]. All rights reserved. This information is not intended as a substitute for professional medical care. Always follow your healthcare  professional's instructions.          Ovarian Cysts    The ovaries are two small organs located on each side of a woman s uterus (womb). They are part of the female reproductive system. Ovarian cysts are sacs filled with fluid or tissue that form on or inside the ovaries.  Ovarian cysts are common in women, especially during childbearing years. There are different types of cysts. Most are harmless (benign) and go away on their own. They often cause no symptoms. If symptoms do occur, they can include mild pain or pressure in the lower belly (abdomen).  Cysts that are large or break (rupture) may cause more severe pain and symptoms. In these cases, you may need hospital care or treatment such as surgery. You may need more extensive treatment if a cyst causes an ovary to twist (called torsion) or if your doctor suspects your cyst is cancerous. Keep in mind that most cysts are not cancerous, however.  General care    To help relieve pain, your healthcare provider may recommend using over-the-counter pain medicine. If needed, your provide may prescribe stronger pain medicine.    Depending on the type of cyst you have, your healthcare provider may advise taking birth control pills. These help shrink cysts in certain cases. They may also help prevent new cysts from forming. Be sure to take these medicines as directed if they are prescribed.    Your healthcare provider may advise you to watch your symptoms over time to see if they go away or worsen. Regular ultrasound tests may also be advised. These can help check if a cyst goes away or grows in size.  Follow-up care  Follow up with your healthcare provider, or as advised.  When to seek medical advice  Call your healthcare provider right away if any of these occur:    Pain worsens or fails to get better with home treatment    Fever of 100.4 F (38 C) or higher (or other fever amount directed by your healthcare provider)    Nausea and vomiting    Weakness, dizziness, or  fainting    Abnormal vaginal bleeding  Date Last Reviewed: 11/1/2017 2000-2017 [employer]. [address] [place] [address], [place] [address]. All rights reserved. This information is not intended as a substitute for professional medical care. Always follow your healthcare professional's instructions.

## 2018-10-17 ENCOUNTER — HOSPITAL ENCOUNTER (EMERGENCY)
Facility: CLINIC | Age: 38
Discharge: HOME | End: 2018-10-17
Attending: STUDENT IN AN ORGANIZED HEALTH CARE EDUCATION/TRAINING PROGRAM | Admitting: STUDENT IN AN ORGANIZED HEALTH CARE EDUCATION/TRAINING PROGRAM
Payer: COMMERCIAL

## 2018-10-17 ENCOUNTER — APPOINTMENT (OUTPATIENT)
Dept: ULTRASOUND IMAGING | Facility: CLINIC | Age: 38
End: 2018-10-17
Attending: STUDENT IN AN ORGANIZED HEALTH CARE EDUCATION/TRAINING PROGRAM
Payer: COMMERCIAL

## 2018-10-17 VITALS
OXYGEN SATURATION: 97 % | RESPIRATION RATE: 20 BRPM | HEART RATE: 92 BPM | DIASTOLIC BLOOD PRESSURE: 54 MMHG | BODY MASS INDEX: 22.76 KG/M2 | SYSTOLIC BLOOD PRESSURE: 106 MMHG | WEIGHT: 145 LBS | TEMPERATURE: 98.4 F | HEIGHT: 67 IN

## 2018-10-17 DIAGNOSIS — N83.202 HEMORRHAGIC CYST OF LEFT OVARY: ICD-10-CM

## 2018-10-17 DIAGNOSIS — N39.0 URINARY TRACT INFECTION WITHOUT HEMATURIA, SITE UNSPECIFIED: ICD-10-CM

## 2018-10-17 DIAGNOSIS — R10.32 LLQ ABDOMINAL PAIN: ICD-10-CM

## 2018-10-17 LAB
ALBUMIN SERPL-MCNC: 3.8 G/DL (ref 3.4–5)
ALBUMIN UR-MCNC: 30 MG/DL
ALP SERPL-CCNC: 52 U/L (ref 40–150)
ALT SERPL W P-5'-P-CCNC: 18 U/L (ref 0–50)
ANION GAP SERPL CALCULATED.3IONS-SCNC: 7 MMOL/L (ref 3–14)
APPEARANCE UR: ABNORMAL
AST SERPL W P-5'-P-CCNC: 18 U/L (ref 0–45)
BACTERIA #/AREA URNS HPF: ABNORMAL /HPF
BASOPHILS # BLD AUTO: 0 10E9/L (ref 0–0.2)
BASOPHILS NFR BLD AUTO: 0.4 %
BILIRUB SERPL-MCNC: 0.3 MG/DL (ref 0.2–1.3)
BILIRUB UR QL STRIP: NEGATIVE
BUN SERPL-MCNC: 11 MG/DL (ref 7–30)
CALCIUM SERPL-MCNC: 8.1 MG/DL (ref 8.5–10.1)
CHLORIDE SERPL-SCNC: 115 MMOL/L (ref 94–109)
CO2 SERPL-SCNC: 20 MMOL/L (ref 20–32)
COLOR UR AUTO: YELLOW
CREAT SERPL-MCNC: 0.69 MG/DL (ref 0.52–1.04)
DIFFERENTIAL METHOD BLD: NORMAL
EOSINOPHIL # BLD AUTO: 0.3 10E9/L (ref 0–0.7)
EOSINOPHIL NFR BLD AUTO: 3.3 %
ERYTHROCYTE [DISTWIDTH] IN BLOOD BY AUTOMATED COUNT: 12.5 % (ref 10–15)
GFR SERPL CREATININE-BSD FRML MDRD: >90 ML/MIN/1.7M2
GLUCOSE SERPL-MCNC: 86 MG/DL (ref 70–99)
GLUCOSE UR STRIP-MCNC: NEGATIVE MG/DL
HCT VFR BLD AUTO: 37.3 % (ref 35–47)
HGB BLD-MCNC: 13.2 G/DL (ref 11.7–15.7)
HGB UR QL STRIP: NEGATIVE
HYALINE CASTS #/AREA URNS LPF: 7 /LPF (ref 0–2)
IMM GRANULOCYTES # BLD: 0 10E9/L (ref 0–0.4)
IMM GRANULOCYTES NFR BLD: 0.2 %
KETONES UR STRIP-MCNC: NEGATIVE MG/DL
LEUKOCYTE ESTERASE UR QL STRIP: ABNORMAL
LYMPHOCYTES # BLD AUTO: 3.5 10E9/L (ref 0.8–5.3)
LYMPHOCYTES NFR BLD AUTO: 37 %
MCH RBC QN AUTO: 32.9 PG (ref 26.5–33)
MCHC RBC AUTO-ENTMCNC: 35.4 G/DL (ref 31.5–36.5)
MCV RBC AUTO: 93 FL (ref 78–100)
MONOCYTES # BLD AUTO: 0.6 10E9/L (ref 0–1.3)
MONOCYTES NFR BLD AUTO: 6 %
MUCOUS THREADS #/AREA URNS LPF: PRESENT /LPF
NEUTROPHILS # BLD AUTO: 5 10E9/L (ref 1.6–8.3)
NEUTROPHILS NFR BLD AUTO: 53.1 %
NITRATE UR QL: NEGATIVE
NRBC # BLD AUTO: 0 10*3/UL
NRBC BLD AUTO-RTO: 0 /100
PH UR STRIP: 5 PH (ref 5–7)
PLATELET # BLD AUTO: 280 10E9/L (ref 150–450)
POTASSIUM SERPL-SCNC: 3.8 MMOL/L (ref 3.4–5.3)
PROT SERPL-MCNC: 7.2 G/DL (ref 6.8–8.8)
RBC # BLD AUTO: 4.01 10E12/L (ref 3.8–5.2)
RBC #/AREA URNS AUTO: 11 /HPF (ref 0–2)
SODIUM SERPL-SCNC: 142 MMOL/L (ref 133–144)
SOURCE: ABNORMAL
SP GR UR STRIP: 1.02 (ref 1–1.03)
SQUAMOUS #/AREA URNS AUTO: 6 /HPF (ref 0–1)
TRANS CELLS #/AREA URNS HPF: 1 /HPF (ref 0–1)
UROBILINOGEN UR STRIP-MCNC: 0 MG/DL (ref 0–2)
WBC # BLD AUTO: 9.4 10E9/L (ref 4–11)
WBC #/AREA URNS AUTO: 208 /HPF (ref 0–5)

## 2018-10-17 PROCEDURE — 87086 URINE CULTURE/COLONY COUNT: CPT | Performed by: STUDENT IN AN ORGANIZED HEALTH CARE EDUCATION/TRAINING PROGRAM

## 2018-10-17 PROCEDURE — 85025 COMPLETE CBC W/AUTO DIFF WBC: CPT | Performed by: STUDENT IN AN ORGANIZED HEALTH CARE EDUCATION/TRAINING PROGRAM

## 2018-10-17 PROCEDURE — 99285 EMERGENCY DEPT VISIT HI MDM: CPT | Mod: Z6 | Performed by: STUDENT IN AN ORGANIZED HEALTH CARE EDUCATION/TRAINING PROGRAM

## 2018-10-17 PROCEDURE — 96374 THER/PROPH/DIAG INJ IV PUSH: CPT | Performed by: STUDENT IN AN ORGANIZED HEALTH CARE EDUCATION/TRAINING PROGRAM

## 2018-10-17 PROCEDURE — 99285 EMERGENCY DEPT VISIT HI MDM: CPT | Mod: 25 | Performed by: STUDENT IN AN ORGANIZED HEALTH CARE EDUCATION/TRAINING PROGRAM

## 2018-10-17 PROCEDURE — 25000128 H RX IP 250 OP 636

## 2018-10-17 PROCEDURE — 96375 TX/PRO/DX INJ NEW DRUG ADDON: CPT | Performed by: STUDENT IN AN ORGANIZED HEALTH CARE EDUCATION/TRAINING PROGRAM

## 2018-10-17 PROCEDURE — 96361 HYDRATE IV INFUSION ADD-ON: CPT | Performed by: STUDENT IN AN ORGANIZED HEALTH CARE EDUCATION/TRAINING PROGRAM

## 2018-10-17 PROCEDURE — 81001 URINALYSIS AUTO W/SCOPE: CPT | Performed by: STUDENT IN AN ORGANIZED HEALTH CARE EDUCATION/TRAINING PROGRAM

## 2018-10-17 PROCEDURE — 25000132 ZZH RX MED GY IP 250 OP 250 PS 637: Performed by: STUDENT IN AN ORGANIZED HEALTH CARE EDUCATION/TRAINING PROGRAM

## 2018-10-17 PROCEDURE — 87088 URINE BACTERIA CULTURE: CPT | Performed by: STUDENT IN AN ORGANIZED HEALTH CARE EDUCATION/TRAINING PROGRAM

## 2018-10-17 PROCEDURE — 87186 SC STD MICRODIL/AGAR DIL: CPT | Performed by: STUDENT IN AN ORGANIZED HEALTH CARE EDUCATION/TRAINING PROGRAM

## 2018-10-17 PROCEDURE — 25000128 H RX IP 250 OP 636: Performed by: STUDENT IN AN ORGANIZED HEALTH CARE EDUCATION/TRAINING PROGRAM

## 2018-10-17 PROCEDURE — 76830 TRANSVAGINAL US NON-OB: CPT

## 2018-10-17 PROCEDURE — 80053 COMPREHEN METABOLIC PANEL: CPT | Performed by: STUDENT IN AN ORGANIZED HEALTH CARE EDUCATION/TRAINING PROGRAM

## 2018-10-17 RX ORDER — OXYCODONE HYDROCHLORIDE 5 MG/1
5-10 TABLET ORAL EVERY 6 HOURS PRN
Qty: 10 TABLET | Refills: 0 | Status: SHIPPED | OUTPATIENT
Start: 2018-10-17 | End: 2019-07-26

## 2018-10-17 RX ORDER — ONDANSETRON 2 MG/ML
8 INJECTION INTRAMUSCULAR; INTRAVENOUS ONCE
Status: COMPLETED | OUTPATIENT
Start: 2018-10-17 | End: 2018-10-17

## 2018-10-17 RX ORDER — SULFAMETHOXAZOLE/TRIMETHOPRIM 800-160 MG
1 TABLET ORAL 2 TIMES DAILY
Qty: 14 TABLET | Refills: 0 | Status: SHIPPED | OUTPATIENT
Start: 2018-10-17 | End: 2018-10-24

## 2018-10-17 RX ORDER — KETOROLAC TROMETHAMINE 15 MG/ML
15 INJECTION, SOLUTION INTRAMUSCULAR; INTRAVENOUS ONCE
Status: COMPLETED | OUTPATIENT
Start: 2018-10-17 | End: 2018-10-17

## 2018-10-17 RX ORDER — OXYCODONE HYDROCHLORIDE 5 MG/1
10 TABLET ORAL ONCE
Status: COMPLETED | OUTPATIENT
Start: 2018-10-17 | End: 2018-10-17

## 2018-10-17 RX ORDER — ONDANSETRON 2 MG/ML
INJECTION INTRAMUSCULAR; INTRAVENOUS
Status: COMPLETED
Start: 2018-10-17 | End: 2018-10-17

## 2018-10-17 RX ORDER — OXYCODONE HYDROCHLORIDE 5 MG/1
5 TABLET ORAL ONCE
Status: DISCONTINUED | OUTPATIENT
Start: 2018-10-17 | End: 2018-10-17

## 2018-10-17 RX ORDER — HYDROMORPHONE HYDROCHLORIDE 1 MG/ML
0.5 INJECTION, SOLUTION INTRAMUSCULAR; INTRAVENOUS; SUBCUTANEOUS ONCE
Status: COMPLETED | OUTPATIENT
Start: 2018-10-17 | End: 2018-10-17

## 2018-10-17 RX ADMIN — ONDANSETRON HYDROCHLORIDE 8 MG: 2 INJECTION, SOLUTION INTRAMUSCULAR; INTRAVENOUS at 19:08

## 2018-10-17 RX ADMIN — KETOROLAC TROMETHAMINE 15 MG: 15 INJECTION, SOLUTION INTRAMUSCULAR; INTRAVENOUS at 19:04

## 2018-10-17 RX ADMIN — Medication 0.5 MG: at 20:27

## 2018-10-17 RX ADMIN — ONDANSETRON 8 MG: 2 INJECTION INTRAMUSCULAR; INTRAVENOUS at 19:08

## 2018-10-17 RX ADMIN — SODIUM CHLORIDE, POTASSIUM CHLORIDE, SODIUM LACTATE AND CALCIUM CHLORIDE 1000 ML: 600; 310; 30; 20 INJECTION, SOLUTION INTRAVENOUS at 19:03

## 2018-10-17 RX ADMIN — OXYCODONE HYDROCHLORIDE 10 MG: 5 TABLET ORAL at 19:02

## 2018-10-17 NOTE — ED PROVIDER NOTES
"  History     Chief Complaint   Patient presents with     Abdominal Pain     bilateral groin pain  started  severe pain  today   tolerable pain  for 3 days  hx of ovarian cyst      HPI  Janel Calzada is a 37 year old female with past medical history which includes fibromyalgia, diverticulitis, anxiety, depression, chronic pancreatitis, alcohol abuse, ADD, fibromyalgia, and status post hysterectomy who presents for evaluation of left lower abdominal pain.  Patient explains that she has had \"problems with ovarian cyst\" over the past year and today's symptoms feel reminiscent of symptoms which led to an emergency department visit in July of this year which resulted in diagnosis of hemorrhagic ovarian cyst.  She explains that she has had a dull ache of the left lower abdomen, constant, mild tolerable for the past 2-3 days.  However shortly after noon today while lying down she developed severe sharp left lower abdominal pain.  The pain is nonradiating and is similar to history of sigmoid diverticulitis.  Patient denies fever, chest pain, cough, back pain, upper abdominal pain, vomiting, diarrhea, constipation, or genitourinary symptoms.  She has taken OTC acetaminophen and ibuprofen without relief, last dosing 1 PM.  She has felt nauseous but relates this to the pain.    Problem List:    Patient Active Problem List    Diagnosis Date Noted     Alcohol abuse, episodic 09/30/2017     Priority: Medium     Overview:   Created by Conversion       Asthma 09/30/2017     Priority: Medium     Overview:   Created by Conversion       Chronic pancreatitis (H) 09/30/2017     Priority: Medium     Overview:   Created by Conversion       Constipation due to pain medication 09/30/2017     Priority: Medium     Leukocytosis 09/30/2017     Priority: Medium     Alcohol induced acute pancreatitis 04/26/2017     Priority: Medium     Anxiety 04/26/2017     Priority: Medium     Fibromyalgia 04/26/2017     Priority: Medium     ADD (attention " deficit disorder) 10/29/2016     Priority: Medium     Bradycardia 10/22/2016     Priority: Medium     Alcohol dependence with alcohol-induced anxiety disorder (H) 10/21/2016     Priority: Medium     Acute diverticulitis 05/21/2016     Priority: Medium     Diverticulitis 05/20/2016     Priority: Medium     Sigmoid diverticulitis 05/20/2016     Priority: Medium     S/P hysterectomy 03/04/2016     Priority: Medium     GERD (gastroesophageal reflux disease) 01/04/2015     Priority: Medium     Depression 07/16/2014     Priority: Medium     Acute pancreatitis 07/14/2014     Priority: Medium        Past Medical History:    Past Medical History:   Diagnosis Date     ADHD      Allergic state      Anxiety      Cancer (H)      Depressive disorder        Past Surgical History:    Past Surgical History:   Procedure Laterality Date     GYN SURGERY       HYSTERECTOMY         Family History:    No family history on file.    Social History:  Marital Status:  Single [1]  Social History   Substance Use Topics     Smoking status: Current Every Day Smoker     Packs/day: 1.00     Years: 16.00     Types: Cigarettes     Smokeless tobacco: Never Used     Alcohol use No        Medications:      oxyCODONE IR (ROXICODONE) 5 MG tablet   sulfamethoxazole-trimethoprim (BACTRIM DS) 800-160 MG per tablet   Amphetamine-Dextroamphetamine (ADDERALL PO)   FLUoxetine HCl (PROZAC PO)   GABAPENTIN PO   oxyCODONE-acetaminophen (PERCOCET) 5-325 MG per tablet         Review of Systems  Constitutional:  Negative for fever or chills.  Cardiovascular:  Negative for chest pain.  Respiratory:  Negative for cough or shortness of breath.  Gastrointestinal: Positive for achy LLQ abdominal pain with nausea.  Negative for vomiting, constipation, diarrhea, or blood with bowel movements.  Typical nonbloody bowel movement this morning.  Genitourinary:  Negative for dysuria, hematuria, vaginal pain or discharge.  Specifically denies concern for sexual transmitted  "infections.  Musculoskeletal: Negative for back pain.  Skin:  Negative for skin lesions.    All others reviewed and are negative.      Physical Exam   BP: 106/54  Pulse: 92  Temp: 98.4  F (36.9  C)  Resp: 20  Height: 170.2 cm (5' 7\")  Weight: 65.8 kg (145 lb)  SpO2: 98 %      Physical Exam  Constitutional:  Well developed, well nourished.  Appears nontoxic but moderately uncomfortable resting on the gurney.  HENT:  Normocephalic and atraumatic.  Symmetric in appearance.  Eyes:  Conjunctivae are normal.  Neck:  Neck supple.  Cardiovascular:  No cyanosis.  RRR.  No audible murmurs noted.   Respiratory:  Effort normal without sign of respiratory distress.  CTAB without diminished regions.    Gastrointestinal:  Soft nondistended abdomen.  LLQ tenderness with guarding.  No rigidity or rebound tenderness.  Negative Brannon's sign.  Negative McBurney's point.    Genitourinary:  Noncontributory.   Musculoskeletal:  Moves extremities spontaneously.  Neurological:  Patient is alert.  Skin:  Skin is warm and dry.  Psychiatric:  Normal mood and affect.      ED Course     ED Course     Procedures              Critical Care time:  none               Results for orders placed or performed during the hospital encounter of 10/17/18 (from the past 24 hour(s))   UA reflex to Microscopic and Culture   Result Value Ref Range    Color Urine Yellow     Appearance Urine Cloudy     Glucose Urine Negative NEG^Negative mg/dL    Bilirubin Urine Negative NEG^Negative    Ketones Urine Negative NEG^Negative mg/dL    Specific Gravity Urine 1.020 1.003 - 1.035    Blood Urine Negative NEG^Negative    pH Urine 5.0 5.0 - 7.0 pH    Protein Albumin Urine 30 (A) NEG^Negative mg/dL    Urobilinogen mg/dL 0.0 0.0 - 2.0 mg/dL    Nitrite Urine Negative NEG^Negative    Leukocyte Esterase Urine Large (A) NEG^Negative    Source Midstream Urine     RBC Urine 11 (H) 0 - 2 /HPF    WBC Urine 208 (H) 0 - 5 /HPF    Bacteria Urine Few (A) NEG^Negative /HPF    Squamous " Epithelial /HPF Urine 6 (H) 0 - 1 /HPF    Transitional Epi 1 0 - 1 /HPF    Mucous Urine Present (A) NEG^Negative /LPF    Hyaline Casts 7 (H) 0 - 2 /LPF   CBC with platelets, differential   Result Value Ref Range    WBC 9.4 4.0 - 11.0 10e9/L    RBC Count 4.01 3.8 - 5.2 10e12/L    Hemoglobin 13.2 11.7 - 15.7 g/dL    Hematocrit 37.3 35.0 - 47.0 %    MCV 93 78 - 100 fl    MCH 32.9 26.5 - 33.0 pg    MCHC 35.4 31.5 - 36.5 g/dL    RDW 12.5 10.0 - 15.0 %    Platelet Count 280 150 - 450 10e9/L    Diff Method Automated Method     % Neutrophils 53.1 %    % Lymphocytes 37.0 %    % Monocytes 6.0 %    % Eosinophils 3.3 %    % Basophils 0.4 %    % Immature Granulocytes 0.2 %    Nucleated RBCs 0 0 /100    Absolute Neutrophil 5.0 1.6 - 8.3 10e9/L    Absolute Lymphocytes 3.5 0.8 - 5.3 10e9/L    Absolute Monocytes 0.6 0.0 - 1.3 10e9/L    Absolute Eosinophils 0.3 0.0 - 0.7 10e9/L    Absolute Basophils 0.0 0.0 - 0.2 10e9/L    Abs Immature Granulocytes 0.0 0 - 0.4 10e9/L    Absolute Nucleated RBC 0.0    Comprehensive metabolic panel   Result Value Ref Range    Sodium 142 133 - 144 mmol/L    Potassium 3.8 3.4 - 5.3 mmol/L    Chloride 115 (H) 94 - 109 mmol/L    Carbon Dioxide 20 20 - 32 mmol/L    Anion Gap 7 3 - 14 mmol/L    Glucose 86 70 - 99 mg/dL    Urea Nitrogen 11 7 - 30 mg/dL    Creatinine 0.69 0.52 - 1.04 mg/dL    GFR Estimate >90 >60 mL/min/1.7m2    GFR Estimate If Black >90 >60 mL/min/1.7m2    Calcium 8.1 (L) 8.5 - 10.1 mg/dL    Bilirubin Total 0.3 0.2 - 1.3 mg/dL    Albumin 3.8 3.4 - 5.0 g/dL    Protein Total 7.2 6.8 - 8.8 g/dL    Alkaline Phosphatase 52 40 - 150 U/L    ALT 18 0 - 50 U/L    AST 18 0 - 45 U/L   Pelvic Ultrasound (US) with doppler imaging (r/o ovarian torsion)    Narrative    US PELVIS COMPLETE WITH TRANSVAGINAL AND DOPPLER LIMITED  10/17/2018  8:09 PM    HISTORY:  Left lower quadrant pain, hysterectomy.     FINDINGS: Ultrasound was performed transvaginally as well as  transabdominally in order to optimally  evaluate the adnexa.    The uterus is not seen and reported to be surgically absent. Within  the right ovary, there was a 1.2 cm dominant follicle. Within the left  ovary, there was a complex 2.2 cm cyst. Doppler waveform analysis  demonstrated grossly normal blood flow to both ovaries. There is no  free pelvic fluid.      Impression    IMPRESSION:  1. Hysterectomy.  2. Left ovarian 2.2 cm complex cyst, possibly a hemorrhagic cyst.       Medications   ketorolac (TORADOL) injection 15 mg (15 mg Intravenous Given 10/17/18 1904)   lactated ringers BOLUS 1,000 mL (0 mLs Intravenous Stopped 10/17/18 2033)   ondansetron (ZOFRAN) injection 8 mg (8 mg Intravenous Given 10/17/18 1908)   oxyCODONE IR (ROXICODONE) tablet 10 mg (10 mg Oral Given 10/17/18 1902)   HYDROmorphone (PF) (DILAUDID) injection 0.5 mg (0.5 mg Intravenous Given 10/17/18 2027)       Assessments & Plan (with Medical Decision Making)   Janel Calzada is a 37 year old female who presents to the department complaining of left lower quadrant abdominal pain.  Symptoms are reminiscent of history of ovarian cyst but today she feels like may be more severe.  Differential diagnosis included diverticulitis, pyelonephritis, ureteral calculus, ovarian torsion and hemorrhagic cyst.  Past surgical history includes hysterectomy.  She does have a history of diverticulitis as well but denies similar sensation and without diarrhea or hematochezia.  Abdominal examination reveals LLQ tenderness with guarding.  Although she denies dysuria, urinalysis positive for significant amount of WBC and concerning for urinary tract infection.  Formal pelvic ultrasound has identified a 2.2 cm complex left ovarian cyst, possibly a hemorrhagic cyst.  In reviewing records she had a right sided hemorrhagic ovarian cyst 3 months prior.  Patient symptoms improved during stay in the department with analgesic medication.  Clinically suspect that her pain is due to the ovarian cyst but urine  infection could also be contributing.  She will be prescribed oral antibiotic Bactrim pending urine culture as well as very short course of analgesic medication to take only as absolutely needed for breakthrough pain.  She is agreeable with the discharge plan we discussed including OB/GYN follow up for recurring ovarian cysts and return instructions for developing symptoms or any other concerns.      Disclaimer:  This note consists of symbols derived from keyboarding, dictation, and/or voice recognition software.  As a result, there may be errors in the script that have gone undetected.  Please consider this when interpreting information found in the chart.        I have reviewed the nursing notes.    I have reviewed the findings, diagnosis, plan and need for follow up with the patient.       Discharge Medication List as of 10/17/2018  8:33 PM      START taking these medications    Details   oxyCODONE IR (ROXICODONE) 5 MG tablet Take 1-2 tablets (5-10 mg) by mouth every 6 hours as needed for severe pain, Disp-10 tablet, R-0, Local Print      sulfamethoxazole-trimethoprim (BACTRIM DS) 800-160 MG per tablet Take 1 tablet by mouth 2 times daily for 7 days, Disp-14 tablet, R-0, Local Print             Final diagnoses:   Urinary tract infection without hematuria, site unspecified   Hemorrhagic cyst of left ovary   LLQ abdominal pain       10/17/2018   [facility] EMERGENCY DEPARTMENT     Shilo Corley DO  10/17/18 7219

## 2018-10-17 NOTE — ED AVS SNAPSHOT
[facility] Emergency Department    [address] [address]    Phone:  [phone]    Fax:  [phone]                                       Janel Calzada   MRN: [id number]    Department:  [facility] Emergency Department   Date of Visit:  10/17/2018           Patient Information     Date Of Birth          1980        Your diagnoses for this visit were:     Urinary tract infection without hematuria, site unspecified     Hemorrhagic cyst of left ovary     LLQ abdominal pain        You were seen by Shilo Corley DO.      Follow-up Information     Follow up with Robina Borrero. Schedule an appointment as soon as possible for a visit in 3 days.    Specialty:  Family Practice    Why:  Followup for reevaluation and managment plan.    Contact information:    [address] [address] [phone]          Follow up with [facility]. Schedule an appointment as soon as possible for a visit in 1 week.    Specialty:  OB/Gyn    Why:  Followup for evaluation and managment plan.    Contact information:    [address] [place] [address] [phone]    Additional information:    The medical center is located at   [address] (between [address] in [place], [place] miles north   of [place]).      Discharge References/Attachments     OVARIAN CYSTS, UNDERSTANDING (ENGLISH)    URINARY TRACT INFECTIONS IN WOMEN (ENGLISH)      24 Hour Appointment Hotline       To make an appointment at any [facility], call [phone] ([phone]). If you don't have a family doctor or clinic, we will help you find one. [facility] are conveniently located to serve the needs of you and your family.             Review of your medicines      START taking        Dose / Directions Last dose taken    oxyCODONE IR 5 MG tablet   Commonly known as:  ROXICODONE   Dose:  5-10 mg   Quantity:  10 tablet         Take 1-2 tablets (5-10 mg) by mouth every 6 hours as needed for severe pain   Refills:  0        sulfamethoxazole-trimethoprim 800-160 MG per tablet   Commonly known as:  BACTRIM DS   Dose:  1 tablet   Quantity:  14 tablet        Take 1 tablet by mouth 2 times daily for 7 days   Refills:  0          Our records show that you are taking the medicines listed below. If these are incorrect, please call your family doctor or clinic.        Dose / Directions Last dose taken    ADDERALL PO        Refills:  0        GABAPENTIN PO        Refills:  0        oxyCODONE-acetaminophen 5-325 MG per tablet   Commonly known as:  PERCOCET   Dose:  1-2 tablet   Quantity:  12 tablet        Take 1-2 tablets by mouth every 4 hours as needed for moderate to severe pain   Refills:  0        PROZAC PO        Refills:  0                Information about OPIOIDS     PRESCRIPTION OPIOIDS: WHAT YOU NEED TO KNOW   We gave you an opioid (narcotic) pain medicine. It is important to manage your pain, but opioids are not always the best choice. You should first try all the other options your care team gave you. Take this medicine for as short a time (and as few doses) as possible.    Some activities can increase your pain, such as bandage changes or therapy sessions. It may help to take your pain medicine 30 to 60 minutes before these activities. Reduce your stress by getting enough sleep, working on hobbies you enjoy and practicing relaxation or meditation. Talk to your care team about ways to manage your pain beyond prescription opioids.    These medicines have risks:    DO NOT drive when on new or higher doses of pain medicine. These medicines can affect your alertness and reaction times, and you could be arrested for driving under the influence (DUI). If you need to use opioids long-term, talk to your care team about driving.    DO NOT operate heavy machinery    DO NOT do any other dangerous activities while taking these medicines.    DO NOT  drink any alcohol while taking these medicines.     If the opioid prescribed includes acetaminophen, DO NOT take with any other medicines that contain acetaminophen. Read all labels carefully. Look for the word  acetaminophen  or  Tylenol.  Ask your pharmacist if you have questions or are unsure.    You can get addicted to pain medicines, especially if you have a history of addiction (chemical, alcohol or substance dependence). Talk to your care team about ways to reduce this risk.    All opioids tend to cause constipation. Drink plenty of water and eat foods that have a lot of fiber, such as fruits, vegetables, prune juice, apple juice and high-fiber cereal. Take a laxative (Miralax, milk of magnesia, Colace, Senna) if you don t move your bowels at least every other day. Other side effects include upset stomach, sleepiness, dizziness, throwing up, tolerance (needing more of the medicine to have the same effect), physical dependence and slowed breathing.    Store your pills in a secure place, locked if possible. We will not replace any lost or stolen medicine. If you don t finish your medicine, please throw away (dispose) as directed by your pharmacist. The [place] [employer]ution [employer] has more information about safe disposal: https://www.pca.[facility].[place].us/living-green/managing-unwanted-medications        Prescriptions were sent or printed at these locations (2 Prescriptions)                   Other Prescriptions                Printed at Department/Unit printer (2 of 2)         oxyCODONE IR (ROXICODONE) 5 MG tablet               sulfamethoxazole-trimethoprim (BACTRIM DS) 800-160 MG per tablet                Procedures and tests performed during your visit     CBC with platelets, differential    Comprehensive metabolic panel    Pelvic Ultrasound (US) with doppler imaging (r/o ovarian torsion)    Peripheral IV catheter    UA reflex to Microscopic and Culture      Orders Needing Specimen Collection     None       Pending Results     Date and Time Order Name Status Description    10/17/2018 1849 Pelvic Ultrasound (US) with doppler imaging (r/o ovarian torsion) Preliminary             Pending Culture Results     No orders found from 10/15/2018 to 10/18/2018.            Pending Results Instructions     If you had any lab results that were not finalized at the time of your Discharge, you can call the ED Lab Result RN at [phone]. You will be contacted by this team for any positive Lab results or changes in treatment. The nurses are available 7 days a week from 10A to 6:30P.  You can leave a message 24 hours per day and they will return your call.        Test Results From Your Hospital Stay        10/17/2018  7:04 PM      Component Results     Component Value Ref Range & Units Status    WBC 9.4 4.0 - 11.0 10e9/L Final    RBC Count 4.01 3.8 - 5.2 10e12/L Final    Hemoglobin 13.2 11.7 - 15.7 g/dL Final    Hematocrit 37.3 35.0 - 47.0 % Final    MCV 93 78 - 100 fl Final    MCH 32.9 26.5 - 33.0 pg Final    MCHC 35.4 31.5 - 36.5 g/dL Final    RDW 12.5 10.0 - 15.0 % Final    Platelet Count 280 150 - 450 10e9/L Final    Diff Method Automated Method  Final    % Neutrophils 53.1 % Final    % Lymphocytes 37.0 % Final    % Monocytes 6.0 % Final    % Eosinophils 3.3 % Final    % Basophils 0.4 % Final    % Immature Granulocytes 0.2 % Final    Nucleated RBCs 0 0 /100 Final    Absolute Neutrophil 5.0 1.6 - 8.3 10e9/L Final    Absolute Lymphocytes 3.5 0.8 - 5.3 10e9/L Final    Absolute Monocytes 0.6 0.0 - 1.3 10e9/L Final    Absolute Eosinophils 0.3 0.0 - 0.7 10e9/L Final    Absolute Basophils 0.0 0.0 - 0.2 10e9/L Final    Abs Immature Granulocytes 0.0 0 - 0.4 10e9/L Final    Absolute Nucleated RBC 0.0  Final         10/17/2018  7:22 PM      Component Results     Component Value Ref Range & Units Status    Sodium 142 133 - 144 mmol/L Final    Potassium 3.8 3.4 - 5.3 mmol/L Final    Chloride 115 (H) 94 - 109 mmol/L Final    Carbon Dioxide 20  20 - 32 mmol/L Final    Anion Gap 7 3 - 14 mmol/L Final    Glucose 86 70 - 99 mg/dL Final    Urea Nitrogen 11 7 - 30 mg/dL Final    Creatinine 0.69 0.52 - 1.04 mg/dL Final    GFR Estimate >90 >60 mL/min/1.7m2 Final    Non  GFR Calc    GFR Estimate If Black >90 >60 mL/min/1.7m2 Final    African American GFR Calc    Calcium 8.1 (L) 8.5 - 10.1 mg/dL Final    Bilirubin Total 0.3 0.2 - 1.3 mg/dL Final    Albumin 3.8 3.4 - 5.0 g/dL Final    Protein Total 7.2 6.8 - 8.8 g/dL Final    Alkaline Phosphatase 52 40 - 150 U/L Final    ALT 18 0 - 50 U/L Final    AST 18 0 - 45 U/L Final         10/17/2018  7:09 PM      Component Results     Component Value Ref Range & Units Status    Color Urine Yellow  Final    Appearance Urine Cloudy  Final    Glucose Urine Negative NEG^Negative mg/dL Final    Bilirubin Urine Negative NEG^Negative Final    Ketones Urine Negative NEG^Negative mg/dL Final    Specific Gravity Urine 1.020 1.003 - 1.035 Final    Blood Urine Negative NEG^Negative Final    pH Urine 5.0 5.0 - 7.0 pH Final    Protein Albumin Urine 30 (A) NEG^Negative mg/dL Final    Urobilinogen mg/dL 0.0 0.0 - 2.0 mg/dL Final    Nitrite Urine Negative NEG^Negative Final    Leukocyte Esterase Urine Large (A) NEG^Negative Final    Source Midstream Urine  Final    RBC Urine 11 (H) 0 - 2 /HPF Final    WBC Urine 208 (H) 0 - 5 /HPF Final    Bacteria Urine Few (A) NEG^Negative /HPF Final    Squamous Epithelial /HPF Urine 6 (H) 0 - 1 /HPF Final    Transitional Epi 1 0 - 1 /HPF Final    Mucous Urine Present (A) NEG^Negative /LPF Final    Hyaline Casts 7 (H) 0 - 2 /LPF Final         10/17/2018  8:14 PM      Narrative     US PELVIS COMPLETE WITH TRANSVAGINAL AND DOPPLER LIMITED  10/17/2018  8:09 PM    HISTORY:  Left lower quadrant pain, hysterectomy.     FINDINGS: Ultrasound was performed transvaginally as well as  transabdominally in order to optimally evaluate the adnexa.    The uterus is not seen and reported to be surgically  "absent. Within  the right ovary, there was a 1.2 cm dominant follicle. Within the left  ovary, there was a complex 2.2 cm cyst. Doppler waveform analysis  demonstrated grossly normal blood flow to both ovaries. There is no  free pelvic fluid.        Impression     IMPRESSION:  1. Hysterectomy.  2. Left ovarian 2.2 cm complex cyst, possibly a hemorrhagic cyst.                Thank you for choosing [facility]       Thank you for choosing [facility] for your care. Our goal is always to provide you with excellent care. Hearing back from our patients is one way we can continue to improve our services. Please take a few minutes to complete the written survey that you may receive in the mail after you visit with us. Thank you!        [facility] Information     [facility] lets you send messages to your doctor, view your test results, renew your prescriptions, schedule appointments and more. To sign up, go to [email or web address] . Click on \"Log in\" on the left side of the screen, which will take you to the Welcome page. Then click on \"Sign up Now\" on the right side of the page.     You will be asked to enter the access code listed below, as well as some personal information. Please follow the directions to create your username and password.     Your access code is: [email or web address]  Expires: 10/23/2018  8:58 PM     Your access code will  in 90 days. If you need help or a new code, please call your [facility] clinic or [phone].        Care EveryWhere ID     This is your Care EveryWhere ID. This could be used by other organizations to access your [facility] medical records  [id number]        Equal Access to Services     JORDAN JANE : Hadhal fraser Sopriya, waaxda luqadaha, qaybta kaalmada lucio, fco campos. So [facility] [phone].    ATENCIÓN: Si habla español, tiene a contreras disposición servicios gratuitos de asistencia lingüística. Llame al [phone].    We comply with applicable " federal civil rights laws and [place] laws. We do not discriminate on the basis of race, color, national origin, age, disability, sex, sexual orientation, or gender identity.            After Visit Summary       This is your record. Keep this with you and show to your community pharmacist(s) and doctor(s) at your next visit.

## 2018-10-17 NOTE — ED NOTES
Pt here with lower abdominal pain, worse on the right. Has had pain for the past 3 days, mush worse today, similar pain to cysts in July. Hx of ovarian cysts over the past year. Pt had a hysterectomy 3 years ago. Denies N/V/D, no urinary symptoms. Has taken tylenol and ibuprofen for pain with no relief.

## 2018-10-17 NOTE — ED AVS SNAPSHOT
[facility] Emergency Department    [address] [address]    Phone:  [phone]    Fax:  [phone]                                       Janel Calzada   MRN: [id number]    Department:  [facility] Emergency Department   Date of Visit:  10/17/2018           After Visit Summary Signature Page     I have received my discharge instructions, and my questions have been answered. I have discussed any challenges I see with this plan with the nurse or doctor.    ..........................................................................................................................................  Patient/Patient Representative Signature      ..........................................................................................................................................  Patient Representative Print Name and Relationship to Patient    ..................................................               ................................................  Date                                   Time    ..........................................................................................................................................  Reviewed by Signature/Title    ...................................................              ..............................................  Date                                               Time          22EPIC Rev 08/18

## 2018-10-19 LAB
BACTERIA SPEC CULT: ABNORMAL
Lab: ABNORMAL
SPECIMEN SOURCE: ABNORMAL

## 2018-10-22 ENCOUNTER — TELEPHONE (OUTPATIENT)
Dept: EMERGENCY MEDICINE | Facility: CLINIC | Age: 38
End: 2018-10-22

## 2018-10-22 NOTE — TELEPHONE ENCOUNTER
[facility] Emergency Department Lab result notification:    Reason for call  Notify of lab results, assess symptoms,  review ED providers recommendations (if necessary) and advise per ED lab result f/u protocol.    Lab result  Final Urine Culture Report on 10/19/19  Emergency Dept discharge antibiotic prescribed: Sulfamethoxazole-Trimethoprim (Bactrim DS, Septra DS) 800-160 mg PO tablet,  1 tablet by mouth 2 times daily for 7 days.  #1. Bacteria, 50,000 to 100,000 colonies/ml Escherichia coli, is SUSCEPTIBLE to Antibiotic.    As per [facility] ED Lab Result protocol, no change in antibiotic therapy.    Left voicemail message requesting a call back to [phone] between 10 a.m. and 6:30 p.m. for patient's ED/UC lab results.    PCP follow-up Questions asked: NO    Vivi Rosenberg RN    [facility] RN  Lung Nodule and ED Lab Results F/U RN  Epic pool (ED late result f/u RN) : [id number]  Ph # [phone]

## 2019-07-26 ENCOUNTER — ANESTHESIA EVENT (OUTPATIENT)
Dept: EMERGENCY MEDICINE | Facility: CLINIC | Age: 39
End: 2019-07-26
Payer: MEDICAID

## 2019-07-26 ENCOUNTER — ANESTHESIA (OUTPATIENT)
Dept: EMERGENCY MEDICINE | Facility: CLINIC | Age: 39
End: 2019-07-26
Payer: MEDICAID

## 2019-07-26 ENCOUNTER — HOSPITAL ENCOUNTER (EMERGENCY)
Facility: CLINIC | Age: 39
Discharge: HOME | End: 2019-07-26
Attending: FAMILY MEDICINE | Admitting: FAMILY MEDICINE
Payer: MEDICAID

## 2019-07-26 VITALS
RESPIRATION RATE: 22 BRPM | TEMPERATURE: 98.1 F | HEART RATE: 56 BPM | OXYGEN SATURATION: 97 % | BODY MASS INDEX: 21.61 KG/M2 | DIASTOLIC BLOOD PRESSURE: 67 MMHG | WEIGHT: 138 LBS | SYSTOLIC BLOOD PRESSURE: 113 MMHG

## 2019-07-26 DIAGNOSIS — B34.9 VIRAL SYNDROME: ICD-10-CM

## 2019-07-26 LAB
ALBUMIN SERPL-MCNC: 4.1 G/DL (ref 3.4–5)
ALP SERPL-CCNC: 58 U/L (ref 40–150)
ALT SERPL W P-5'-P-CCNC: 16 U/L (ref 0–50)
ANION GAP SERPL CALCULATED.3IONS-SCNC: 7 MMOL/L (ref 3–14)
APPEARANCE CSF: CLEAR
AST SERPL W P-5'-P-CCNC: 13 U/L (ref 0–45)
BASOPHILS # BLD AUTO: 0.1 10E9/L (ref 0–0.2)
BASOPHILS NFR BLD AUTO: 0.3 %
BILIRUB SERPL-MCNC: 0.5 MG/DL (ref 0.2–1.3)
BUN SERPL-MCNC: 8 MG/DL (ref 7–30)
CALCIUM SERPL-MCNC: 8.4 MG/DL (ref 8.5–10.1)
CHLORIDE SERPL-SCNC: 110 MMOL/L (ref 94–109)
CO2 SERPL-SCNC: 22 MMOL/L (ref 20–32)
COLOR CSF: COLORLESS
CREAT SERPL-MCNC: 0.64 MG/DL (ref 0.52–1.04)
DEPRECATED S PYO AG THROAT QL EIA: NORMAL
DIFFERENTIAL METHOD BLD: ABNORMAL
EOSINOPHIL # BLD AUTO: 0.1 10E9/L (ref 0–0.7)
EOSINOPHIL NFR BLD AUTO: 0.8 %
ERYTHROCYTE [DISTWIDTH] IN BLOOD BY AUTOMATED COUNT: 13.4 % (ref 10–15)
GFR SERPL CREATININE-BSD FRML MDRD: >90 ML/MIN/{1.73_M2}
GLUCOSE CSF-MCNC: 64 MG/DL (ref 40–70)
GLUCOSE SERPL-MCNC: 96 MG/DL (ref 70–99)
GRAM STN SPEC: NORMAL
HCT VFR BLD AUTO: 38.2 % (ref 35–47)
HGB BLD-MCNC: 13 G/DL (ref 11.7–15.7)
IMM GRANULOCYTES # BLD: 0.1 10E9/L (ref 0–0.4)
IMM GRANULOCYTES NFR BLD: 0.5 %
LYMPHOCYTES # BLD AUTO: 2.5 10E9/L (ref 0.8–5.3)
LYMPHOCYTES NFR BLD AUTO: 16.6 %
Lab: NORMAL
MCH RBC QN AUTO: 32.2 PG (ref 26.5–33)
MCHC RBC AUTO-ENTMCNC: 34 G/DL (ref 31.5–36.5)
MCV RBC AUTO: 95 FL (ref 78–100)
MONOCYTES # BLD AUTO: 1.3 10E9/L (ref 0–1.3)
MONOCYTES NFR BLD AUTO: 9 %
NEUTROPHILS # BLD AUTO: 10.7 10E9/L (ref 1.6–8.3)
NEUTROPHILS NFR BLD AUTO: 72.8 %
NRBC # BLD AUTO: 0 10*3/UL
NRBC BLD AUTO-RTO: 0 /100
PLATELET # BLD AUTO: 208 10E9/L (ref 150–450)
POTASSIUM SERPL-SCNC: 3.6 MMOL/L (ref 3.4–5.3)
PROT CSF-MCNC: 45 MG/DL (ref 15–60)
PROT SERPL-MCNC: 7.2 G/DL (ref 6.8–8.8)
RBC # BLD AUTO: 4.04 10E12/L (ref 3.8–5.2)
RBC # CSF MANUAL: 0 /UL (ref 0–2)
SODIUM SERPL-SCNC: 139 MMOL/L (ref 133–144)
SPECIMEN SOURCE: NORMAL
SPECIMEN SOURCE: NORMAL
SPECIMEN VOL CSF: 2.5 ML
TUBE # CSF: 4 #
WBC # BLD AUTO: 14.8 10E9/L (ref 4–11)
WBC # CSF MANUAL: 0 /UL (ref 0–5)

## 2019-07-26 PROCEDURE — 25800030 ZZH RX IP 258 OP 636: Performed by: FAMILY MEDICINE

## 2019-07-26 PROCEDURE — 96376 TX/PRO/DX INJ SAME DRUG ADON: CPT | Mod: 59 | Performed by: FAMILY MEDICINE

## 2019-07-26 PROCEDURE — 25000128 H RX IP 250 OP 636: Performed by: FAMILY MEDICINE

## 2019-07-26 PROCEDURE — 86666 EHRLICHIA ANTIBODY: CPT | Mod: 91 | Performed by: FAMILY MEDICINE

## 2019-07-26 PROCEDURE — 99285 EMERGENCY DEPT VISIT HI MDM: CPT | Mod: Z6 | Performed by: FAMILY MEDICINE

## 2019-07-26 PROCEDURE — 96361 HYDRATE IV INFUSION ADD-ON: CPT | Mod: 59 | Performed by: FAMILY MEDICINE

## 2019-07-26 PROCEDURE — 87880 STREP A ASSAY W/OPTIC: CPT | Performed by: FAMILY MEDICINE

## 2019-07-26 PROCEDURE — 86618 LYME DISEASE ANTIBODY: CPT | Performed by: FAMILY MEDICINE

## 2019-07-26 PROCEDURE — 96375 TX/PRO/DX INJ NEW DRUG ADDON: CPT | Performed by: FAMILY MEDICINE

## 2019-07-26 PROCEDURE — 84157 ASSAY OF PROTEIN OTHER: CPT | Performed by: FAMILY MEDICINE

## 2019-07-26 PROCEDURE — 86788 WEST NILE VIRUS AB IGM: CPT | Performed by: FAMILY MEDICINE

## 2019-07-26 PROCEDURE — 40000671 ZZH STATISTIC ANESTHESIA CASE

## 2019-07-26 PROCEDURE — 62270 DX LMBR SPI PNXR: CPT | Performed by: NURSE ANESTHETIST, CERTIFIED REGISTERED

## 2019-07-26 PROCEDURE — 80053 COMPREHEN METABOLIC PANEL: CPT | Performed by: FAMILY MEDICINE

## 2019-07-26 PROCEDURE — 89050 BODY FLUID CELL COUNT: CPT | Performed by: FAMILY MEDICINE

## 2019-07-26 PROCEDURE — 96365 THER/PROPH/DIAG IV INF INIT: CPT | Mod: 59 | Performed by: FAMILY MEDICINE

## 2019-07-26 PROCEDURE — 87205 SMEAR GRAM STAIN: CPT | Performed by: FAMILY MEDICINE

## 2019-07-26 PROCEDURE — 86789 WEST NILE VIRUS ANTIBODY: CPT | Performed by: FAMILY MEDICINE

## 2019-07-26 PROCEDURE — 99285 EMERGENCY DEPT VISIT HI MDM: CPT | Mod: 25 | Performed by: FAMILY MEDICINE

## 2019-07-26 PROCEDURE — 87070 CULTURE OTHR SPECIMN AEROBIC: CPT | Performed by: FAMILY MEDICINE

## 2019-07-26 PROCEDURE — 87015 SPECIMEN INFECT AGNT CONCNTJ: CPT | Performed by: FAMILY MEDICINE

## 2019-07-26 PROCEDURE — 86617 LYME DISEASE ANTIBODY: CPT | Mod: 91 | Performed by: FAMILY MEDICINE

## 2019-07-26 PROCEDURE — 87081 CULTURE SCREEN ONLY: CPT | Performed by: FAMILY MEDICINE

## 2019-07-26 PROCEDURE — 82945 GLUCOSE OTHER FLUID: CPT | Performed by: FAMILY MEDICINE

## 2019-07-26 PROCEDURE — 86617 LYME DISEASE ANTIBODY: CPT | Performed by: FAMILY MEDICINE

## 2019-07-26 PROCEDURE — 85025 COMPLETE CBC W/AUTO DIFF WBC: CPT | Performed by: FAMILY MEDICINE

## 2019-07-26 PROCEDURE — 62270 DX LMBR SPI PNXR: CPT

## 2019-07-26 PROCEDURE — 37000011 ZZH ANESTHESIA WARD SERVICE: Performed by: NURSE ANESTHETIST, CERTIFIED REGISTERED

## 2019-07-26 RX ORDER — ONDANSETRON 8 MG/1
8 TABLET, FILM COATED ORAL EVERY 8 HOURS PRN
COMMUNITY

## 2019-07-26 RX ORDER — IBUPROFEN 800 MG/1
800 TABLET, FILM COATED ORAL EVERY 8 HOURS PRN
COMMUNITY

## 2019-07-26 RX ORDER — OXYCODONE AND ACETAMINOPHEN 5; 325 MG/1; MG/1
1 TABLET ORAL EVERY 6 HOURS PRN
COMMUNITY
Start: 2019-02-02

## 2019-07-26 RX ORDER — CEFTRIAXONE SODIUM 2 G/50ML
2 INJECTION, SOLUTION INTRAVENOUS EVERY 12 HOURS
Status: DISCONTINUED | OUTPATIENT
Start: 2019-07-26 | End: 2019-07-26 | Stop reason: HOSPADM

## 2019-07-26 RX ORDER — FLUOXETINE 10 MG/1
30 CAPSULE ORAL AT BEDTIME
COMMUNITY
Start: 2017-04-30 | End: 2019-07-26

## 2019-07-26 RX ORDER — HYDROCODONE BITARTRATE AND ACETAMINOPHEN 5; 325 MG/1; MG/1
1-2 TABLET ORAL EVERY 6 HOURS PRN
Qty: 12 TABLET | Refills: 0 | Status: SHIPPED | OUTPATIENT
Start: 2019-07-26 | End: 2021-06-22

## 2019-07-26 RX ORDER — TOPIRAMATE 100 MG/1
150 TABLET, FILM COATED ORAL DAILY
COMMUNITY

## 2019-07-26 RX ORDER — NICOTINE POLACRILEX 4 MG/1
20 GUM, CHEWING ORAL DAILY PRN
COMMUNITY

## 2019-07-26 RX ORDER — KETOROLAC TROMETHAMINE 15 MG/ML
15 INJECTION, SOLUTION INTRAMUSCULAR; INTRAVENOUS ONCE
Status: COMPLETED | OUTPATIENT
Start: 2019-07-26 | End: 2019-07-26

## 2019-07-26 RX ORDER — GABAPENTIN 300 MG/1
900 CAPSULE ORAL 3 TIMES DAILY
COMMUNITY
Start: 2017-04-30

## 2019-07-26 RX ORDER — DEXTROAMPHETAMINE SACCHARATE, AMPHETAMINE ASPARTATE, DEXTROAMPHETAMINE SULFATE AND AMPHETAMINE SULFATE 5; 5; 5; 5 MG/1; MG/1; MG/1; MG/1
20 TABLET ORAL 2 TIMES DAILY
COMMUNITY
Start: 2017-04-30

## 2019-07-26 RX ORDER — HYDROMORPHONE HYDROCHLORIDE 1 MG/ML
0.5 INJECTION, SOLUTION INTRAMUSCULAR; INTRAVENOUS; SUBCUTANEOUS
Status: DISCONTINUED | OUTPATIENT
Start: 2019-07-26 | End: 2019-07-26 | Stop reason: HOSPADM

## 2019-07-26 RX ORDER — ONDANSETRON 4 MG/1
4-8 TABLET, ORALLY DISINTEGRATING ORAL EVERY 8 HOURS PRN
Qty: 12 TABLET | Refills: 0 | Status: SHIPPED | OUTPATIENT
Start: 2019-07-26 | End: 2019-07-29

## 2019-07-26 RX ORDER — HYDROXYZINE HYDROCHLORIDE 25 MG/1
25-50 TABLET, FILM COATED ORAL EVERY 4 HOURS PRN
COMMUNITY

## 2019-07-26 RX ADMIN — SODIUM CHLORIDE, POTASSIUM CHLORIDE, SODIUM LACTATE AND CALCIUM CHLORIDE 1000 ML: 600; 310; 30; 20 INJECTION, SOLUTION INTRAVENOUS at 14:49

## 2019-07-26 RX ADMIN — HYDROMORPHONE HYDROCHLORIDE 0.5 MG: 1 INJECTION, SOLUTION INTRAMUSCULAR; INTRAVENOUS; SUBCUTANEOUS at 16:45

## 2019-07-26 RX ADMIN — KETOROLAC TROMETHAMINE 15 MG: 15 INJECTION, SOLUTION INTRAMUSCULAR; INTRAVENOUS at 14:49

## 2019-07-26 RX ADMIN — HYDROMORPHONE HYDROCHLORIDE 0.5 MG: 1 INJECTION, SOLUTION INTRAMUSCULAR; INTRAVENOUS; SUBCUTANEOUS at 16:09

## 2019-07-26 RX ADMIN — CEFTRIAXONE SODIUM 2 G: 2 INJECTION, SOLUTION INTRAVENOUS at 16:45

## 2019-07-26 ASSESSMENT — LIFESTYLE VARIABLES: TOBACCO_USE: 1

## 2019-07-26 NOTE — DISCHARGE INSTRUCTIONS
Return to the Emergency Room if the following occurs:     Worsened pain, dehydration, or for any concern at anytime.    Or, follow-up with the following provider as we discussed:     Return to your primary doctor as needed, or if not improved over the weekend.    Medications discussed:    Ibuprofen 600 mg every six hours for pain (7 days duration).  Tylenol 1000 mg every six hours for pain (7 days duration).  Therefore, you can alternate these every three hours and do it safely.  Norco (5/325) 1-2 tablets every 8 hours for pain (start with one).  Note, each tab has 325 mg tylenol.  Do not exceed 3000 mg tylenol per 24 hours.  MiraLax OTC.  Titrate the medicine to achieve a soft, easy stool every 1-2 days.  Zofran for nausea, as needed.    If you received pain-relieving or sedating medication during your time in the ER, avoid alcohol, driving automobiles, or working with machinery.  Also, a responsible adult must stay with you.        Call the Nurse Advice Line at [phone] or [phone] for any concern at anytime.

## 2019-07-26 NOTE — ANESTHESIA PROCEDURE NOTES
Peripheral nerve/Neuraxial procedure note : lumbar puncture  Pre-Procedure  Performed by  Cuca Frey APRN CRNA   Location: ED      Pre-Anesthestic Checklist: patient identified, IV checked, risks and benefits discussed, informed consent, monitors and equipment checked and pre-op evaluation    Timeout  Correct Patient: Yes   Correct Procedure: Yes   Correct Site: Yes   Correct Laterality: N/A   Correct Position: Yes   Site Marked: N/A   .   Procedure Documentation  ASA 2  .    Procedure:    Lumbar puncture.  Insertion Site:L3-4  (midline approach)      Patient Prep;mask, sterile gloves, chlorhexidine gluconate and isopropyl alcohol, patient draped.  .  Needle: Ketty tip Spinal Needle (gauge): 25  Spinal/LP Needle Length (inches): 3.5 # of attempts: 1 and  # of redirects:  1 .       Assessment/Narrative  .  .  9 mL of clear CSF fluid removed while lateral   .

## 2019-07-26 NOTE — ANESTHESIA POSTPROCEDURE EVALUATION
Patient: Janel Calzada    * No procedures listed *    Diagnosis:* No pre-op diagnosis entered *  Diagnosis Additional Information: No value filed.    Anesthesia Type:  Spinal    Note:  Anesthesia Post Evaluation    Patient location during evaluation: ED and Bedside  Patient participation: Able to fully participate in evaluation  Level of consciousness: awake and alert  Pain management: adequate  Airway patency: patent  Cardiovascular status: acceptable and hemodynamically stable  Respiratory status: acceptable and room air  Hydration status: acceptable  PONV: none     Anesthetic complications: None          Last vitals:  Vitals:    07/26/19 1600 07/26/19 1615 07/26/19 1630   BP: 117/72  104/64   Pulse: 52  63   Resp:      Temp:      SpO2:  99% 100%         Electronically Signed By: IRMA Nascimento CRNA  July 26, 2019  4:57 PM

## 2019-07-26 NOTE — ANESTHESIA PREPROCEDURE EVALUATION
Anesthesia Pre-Procedure Evaluation    Patient: Janel Calzada   MRN: [id number] : 1980          Preoperative Diagnosis: * No pre-op diagnosis entered *    * No procedures listed *    Past Medical History:   Diagnosis Date     ADHD      Allergic state      Anxiety      Cancer (H)      Depressive disorder      Past Surgical History:   Procedure Laterality Date     GYN SURGERY       HYSTERECTOMY         Anesthesia Evaluation     . Pt has had prior anesthetic. Type: General           ROS/MED HX    ENT/Pulmonary:     (+)tobacco use, Current use 0.5 packs/day  Intermittent asthma , . .    Neurologic:     (+)other neuro Fibromyalgia    Cardiovascular:  - neg cardiovascular ROS       METS/Exercise Tolerance:     Hematologic:  - neg hematologic  ROS       Musculoskeletal:  - neg musculoskeletal ROS       GI/Hepatic:     (+) GERD Inflammatory bowel disease, Other GI/Hepatic Chronic pancreatitis      Renal/Genitourinary:         Endo:  - neg endo ROS       Psychiatric:     (+) psychiatric history anxiety, depression and other (comment) (ADHD)      Infectious Disease:  - neg infectious disease ROS       Malignancy:   (+) Malignancy History of Other  Other CA Cervical Remission status post Surgery         Other:    - neg other ROS                      Physical Exam  Normal systems: cardiovascular, pulmonary and dental    Airway   Mallampati: I  TM distance: >3 FB  Neck ROM: full    Dental     Cardiovascular       Pulmonary             Lab Results   Component Value Date    WBC 14.8 (H) 2019    HGB 13.0 2019    HCT 38.2 2019     2019     2019    POTASSIUM 3.6 2019    CHLORIDE 110 (H) 2019    CO2 22 2019    BUN 8 2019    CR 0.64 2019    GLC 96 2019    BROOK 8.4 (L) 2019    ALBUMIN 4.1 2019    PROTTOTAL 7.2 2019    ALT 16 2019    AST 13 2019    ALKPHOS 58 2019    BILITOTAL 0.5 2019    HCGS Negative  "07/25/2018       Preop Vitals  BP Readings from Last 3 Encounters:   07/26/19 108/67   10/17/18 106/54   07/25/18 101/68    Pulse Readings from Last 3 Encounters:   10/17/18 92   07/25/18 62   09/06/15 101      Resp Readings from Last 3 Encounters:   07/26/19 22   10/17/18 20   07/25/18 16    SpO2 Readings from Last 3 Encounters:   07/26/19 98%   10/17/18 97%   07/25/18 98%      Temp Readings from Last 1 Encounters:   07/26/19 36.7  C (98.1  F) (Oral)    Ht Readings from Last 1 Encounters:   10/17/18 1.702 m (5' 7\")      Wt Readings from Last 1 Encounters:   07/26/19 62.6 kg (138 lb)    Estimated body mass index is 21.61 kg/m  as calculated from the following:    Height as of 10/17/18: 1.702 m (5' 7\").    Weight as of this encounter: 62.6 kg (138 lb).       Anesthesia Plan      History & Physical Review  History and physical reviewed and following examination; no interval change.    ASA Status:  2 .    NPO Status:  > 4 hours    Plan for Spinal   PONV prophylaxis:  Ondansetron (or other 5HT-3) and Dexamethasone or Solumedrol       Postoperative Care  Postoperative pain management:  Multi-modal analgesia.      Consents  Anesthetic plan, risks, benefits and alternatives discussed with:  Patient..                 IRMA Nascimento CRNA  "

## 2019-07-26 NOTE — ED PROVIDER NOTES
"  HPI   The patient is a 38-year-old female presenting with headache, neck pain, and concerns for an infectious process causing her symptoms.  Her past medical history is reviewed.  She denies drugs of abuse.  She works as a  and reports \"cleaning a really dirty house within the past few days.\"  She tells me her symptoms began shortly thereafter.    The patient has had a sore throat with nasal congestion since 2 days ago.  She began to experience a headache at the same time.  Her headache has worsened with time.  Overnight, her headache has become severe.  She has neck pain and stiffness as well as back pain and stiffness now.  She has been intermittently nauseous but not currently.  No vomiting.  She does have some light sensitivity.  She feels like her ears are full.  No ear pain.  No green nasal discharge from the nose but she does describe nasal congestion.  No dental pain.  No throat swelling or trouble with swallowing or talking.  No recent travel.  No other known sick contacts.  No skin rash.  No obvious tick bites though she has been camping recently.        Allergies:  Allergies   Allergen Reactions     Amoxicillin      Doxycycline      Penicillins      Problem List:    Patient Active Problem List    Diagnosis Date Noted     Alcohol abuse, episodic 09/30/2017     Priority: Medium     Overview:   Created by Conversion       Asthma 09/30/2017     Priority: Medium     Overview:   Created by Conversion       Chronic pancreatitis (H) 09/30/2017     Priority: Medium     Overview:   Created by Conversion       Constipation due to pain medication 09/30/2017     Priority: Medium     Leukocytosis 09/30/2017     Priority: Medium     Alcohol induced acute pancreatitis 04/26/2017     Priority: Medium     Anxiety 04/26/2017     Priority: Medium     Fibromyalgia 04/26/2017     Priority: Medium     ADD (attention deficit disorder) 10/29/2016     Priority: Medium     Bradycardia 10/22/2016     Priority: Medium "     Alcohol dependence with alcohol-induced anxiety disorder (H) 10/21/2016     Priority: Medium     Acute diverticulitis 05/21/2016     Priority: Medium     Diverticulitis 05/20/2016     Priority: Medium     Sigmoid diverticulitis 05/20/2016     Priority: Medium     S/P hysterectomy 03/04/2016     Priority: Medium     GERD (gastroesophageal reflux disease) 01/04/2015     Priority: Medium     Depression 07/16/2014     Priority: Medium     Acute pancreatitis 07/14/2014     Priority: Medium      Past Medical History:    Past Medical History:   Diagnosis Date     ADHD      Allergic state      Anxiety      Cancer (H)      Depressive disorder      Past Surgical History:    Past Surgical History:   Procedure Laterality Date     GYN SURGERY       HYSTERECTOMY       Family History:    No family history on file.  Social History:  Marital Status:  Single [1]  Social History     Tobacco Use     Smoking status: Current Every Day Smoker     Packs/day: 1.00     Years: 16.00     Pack years: 16.00     Types: Cigarettes     Smokeless tobacco: Never Used   Substance Use Topics     Alcohol use: No     Drug use: No      Medications:      amphetamine-dextroamphetamine (ADDERALL) 20 MG tablet   FLUoxetine (PROZAC) 20 MG capsule   gabapentin (NEURONTIN) 300 MG capsule   HYDROcodone-acetaminophen (NORCO) 5-325 MG tablet   hydrOXYzine (ATARAX) 50 MG tablet   ibuprofen (ADVIL/MOTRIN) 800 MG tablet   omeprazole 20 MG tablet   ondansetron (ZOFRAN ODT) 4 MG ODT tab   ondansetron (ZOFRAN) 8 MG tablet   oxyCODONE-acetaminophen (PERCOCET) 5-325 MG tablet   topiramate (TOPAMAX) 50 MG tablet     Review of Systems   All other systems reviewed and are negative.      PE   BP: 108/67  Pulse: 62  Heart Rate: 72  Temp: 98.1  F (36.7  C)  Resp: 22  Weight: 62.6 kg (138 lb)  SpO2: 98 %  Physical Exam   Constitutional: She is oriented to person, place, and time. She appears well-developed and well-nourished. She appears distressed.   Baseline comfortable.   She is moaning but able to answer questions appropriately.  The room is darkened.   HENT:   Head: Normocephalic and atraumatic.   Right Ear: External ear normal.   Left Ear: External ear normal.   Eyes: Conjunctivae and EOM are normal.   Neck:   She has pain with range of motion.   Cardiovascular: Normal rate and regular rhythm.   Pulmonary/Chest: Effort normal.   Abdominal: Soft. There is no tenderness.   Musculoskeletal: Normal range of motion.   Neurological: She is alert and oriented to person, place, and time.   Skin: Skin is warm and dry.   Psychiatric: She has a normal mood and affect. Her behavior is normal.       ED COURSE and MDM   1542.  The patient has a severe headache with neck pain and stiffness.  She does not have a fever but her white blood cell count is elevated.  Her strep test is negative.  There is certainly concern for meningitis and an LP will be ordered.  Antibiotics ordered empirically.    1741.  The CSF result is unremarkable.  Viral syndrome will be diagnosed.  No need for outpatient antibiotics.  No emergent need for hospitalization.  I will provide a prescription for Zofran and hydrocodone/acetaminophen, 12 tablets.  Follow-up if not improving.  She is sleeping in the room when I enter.  She arouses quickly and complains of persistent pain.  She does talk about her head trauma which occurred around 2 weeks ago.  She did not have loss of consciousness.  She was without symptoms of headache or concussion after the injury.  I have low concern for trauma related pain.  Especially given the fact that she is with obvious infectious symptoms such as a sore throat, congestion, and a white blood cell count that time.    LABS  Labs Ordered and Resulted from Time of ED Arrival Up to the Time of Departure from the ED   CBC WITH PLATELETS DIFFERENTIAL - Abnormal; Notable for the following components:       Result Value    WBC 14.8 (*)     Absolute Neutrophil 10.7 (*)     All other components within  normal limits   COMPREHENSIVE METABOLIC PANEL - Abnormal; Notable for the following components:    Chloride 110 (*)     Calcium 8.4 (*)     All other components within normal limits   LYME DISEASE JYOTI WITH REFLEX TO WB SERUM   ANAPLASMA PHAGOCYTOPH ANTIBODY IGG IGM   RAPID STREP SCREEN   BETA STREP GROUP A CULTURE   GLUCOSE CSF   PROTEIN TOTAL CSF   GRAM STAIN   CSF CULTURE AEROBIC BACTERIAL   CELL COUNT WITH DIFFERENTIAL CSF   LYME IGG AND IGM CSF IMMUNOBLOT   WEST NILE VIRUS IGG AND IGM CSF       IMAGING  Images reviewed by me.  Radiology report also reviewed.  No orders to display       Medications   cefTRIAXone IN D5W (ROCEPHIN) intermittent infusion 2 g (2 g Intravenous New Bag 7/26/19 1645)   HYDROmorphone (PF) (DILAUDID) injection 0.5 mg (0.5 mg Intravenous Given 7/26/19 1645)   ketorolac (TORADOL) injection 15 mg (15 mg Intravenous Given 7/26/19 1449)   lactated ringers BOLUS 1,000 mL (0 mLs Intravenous Stopped 7/26/19 1608)         IMPRESSION       ICD-10-CM    1. Viral syndrome B34.9             Medication List      Started    HYDROcodone-acetaminophen 5-325 MG tablet  Commonly known as:  NORCO  1-2 tablets, Oral, EVERY 6 HOURS PRN, maximum 10 tablet(s) per day     ondansetron 4 MG ODT tab  Commonly known as:  ZOFRAN ODT  4-8 mg, Oral, EVERY 8 HOURS PRN                          Colby Cuenca MD  07/26/19 3882

## 2019-07-28 ENCOUNTER — HOSPITAL ENCOUNTER (EMERGENCY)
Facility: CLINIC | Age: 39
Discharge: HOME | End: 2019-07-29
Attending: EMERGENCY MEDICINE | Admitting: EMERGENCY MEDICINE
Payer: MEDICAID

## 2019-07-28 DIAGNOSIS — G44.211 INTRACTABLE EPISODIC TENSION-TYPE HEADACHE: ICD-10-CM

## 2019-07-28 LAB
B BURGDOR IGG+IGM SER QL: 0.28 (ref 0–0.89)
BACTERIA SPEC CULT: NORMAL
Lab: NORMAL
SPECIMEN SOURCE: NORMAL

## 2019-07-28 PROCEDURE — 25000128 H RX IP 250 OP 636: Performed by: EMERGENCY MEDICINE

## 2019-07-28 PROCEDURE — 99284 EMERGENCY DEPT VISIT MOD MDM: CPT | Performed by: EMERGENCY MEDICINE

## 2019-07-28 PROCEDURE — 99284 EMERGENCY DEPT VISIT MOD MDM: CPT | Mod: Z6 | Performed by: EMERGENCY MEDICINE

## 2019-07-28 PROCEDURE — 25000132 ZZH RX MED GY IP 250 OP 250 PS 637: Performed by: EMERGENCY MEDICINE

## 2019-07-28 RX ORDER — OLANZAPINE 10 MG/2ML
10 INJECTION, POWDER, FOR SOLUTION INTRAMUSCULAR DAILY PRN
Status: DISCONTINUED | OUTPATIENT
Start: 2019-07-28 | End: 2019-07-29 | Stop reason: HOSPADM

## 2019-07-28 RX ORDER — ACETAMINOPHEN 325 MG/1
975 TABLET ORAL ONCE
Status: COMPLETED | OUTPATIENT
Start: 2019-07-28 | End: 2019-07-28

## 2019-07-28 RX ADMIN — OLANZAPINE 10 MG: 10 INJECTION, POWDER, FOR SOLUTION INTRAMUSCULAR at 22:45

## 2019-07-28 RX ADMIN — ACETAMINOPHEN 975 MG: 325 TABLET, FILM COATED ORAL at 22:46

## 2019-07-28 ASSESSMENT — ENCOUNTER SYMPTOMS
SHORTNESS OF BREATH: 0
HEADACHES: 1
ABDOMINAL PAIN: 0
FEVER: 0

## 2019-07-28 ASSESSMENT — MIFFLIN-ST. JEOR: SCORE: 1352.67

## 2019-07-28 NOTE — ED AVS SNAPSHOT
[facility] Department  [address] [address]  Phone:  [phone]  Fax:  [phone]                                    Janel Calzada   MRN: [id number]    Department:  [facility] Department   Date of Visit:  7/28/2019           After Visit Summary Signature Page    I have received my discharge instructions, and my questions have been answered. I have discussed any challenges I see with this plan with the nurse or doctor.    ..........................................................................................................................................  Patient/Patient Representative Signature      ..........................................................................................................................................  Patient Representative Print Name and Relationship to Patient    ..................................................               ................................................  Date                                   Time    ..........................................................................................................................................  Reviewed by Signature/Title    ...................................................              ..............................................  Date                                               Time          22EPIC Rev 08/18

## 2019-07-29 VITALS
BODY MASS INDEX: 21.97 KG/M2 | OXYGEN SATURATION: 96 % | HEIGHT: 67 IN | DIASTOLIC BLOOD PRESSURE: 68 MMHG | WEIGHT: 140 LBS | TEMPERATURE: 98.4 F | HEART RATE: 62 BPM | RESPIRATION RATE: 14 BRPM | SYSTOLIC BLOOD PRESSURE: 102 MMHG

## 2019-07-29 LAB
B BURGDOR IGG CSF QL IB: NEGATIVE
B BURGDOR IGM CSF QL IB: NEGATIVE

## 2019-07-29 PROCEDURE — 25000128 H RX IP 250 OP 636: Performed by: EMERGENCY MEDICINE

## 2019-07-29 PROCEDURE — 25000125 ZZHC RX 250: Performed by: EMERGENCY MEDICINE

## 2019-07-29 RX ORDER — KETOROLAC TROMETHAMINE 30 MG/ML
30 INJECTION, SOLUTION INTRAMUSCULAR; INTRAVENOUS ONCE
Status: COMPLETED | OUTPATIENT
Start: 2019-07-29 | End: 2019-07-29

## 2019-07-29 RX ORDER — METOCLOPRAMIDE 10 MG/1
10 TABLET ORAL 3 TIMES DAILY PRN
Qty: 30 TABLET | Refills: 0 | Status: SHIPPED | OUTPATIENT
Start: 2019-07-29 | End: 2021-06-22

## 2019-07-29 RX ORDER — LORAZEPAM 1 MG/1
1 TABLET ORAL ONCE
Status: DISCONTINUED | OUTPATIENT
Start: 2019-07-29 | End: 2019-07-29

## 2019-07-29 RX ORDER — KETOROLAC TROMETHAMINE 10 MG/1
10 TABLET, FILM COATED ORAL EVERY 6 HOURS PRN
Qty: 20 TABLET | Refills: 0 | Status: SHIPPED | OUTPATIENT
Start: 2019-07-29 | End: 2021-06-22

## 2019-07-29 RX ORDER — DEXAMETHASONE SODIUM PHOSPHATE 4 MG/ML
10 VIAL (ML) INJECTION ONCE
Status: COMPLETED | OUTPATIENT
Start: 2019-07-29 | End: 2019-07-29

## 2019-07-29 RX ADMIN — KETOROLAC TROMETHAMINE 30 MG: 30 INJECTION, SOLUTION INTRAMUSCULAR at 00:49

## 2019-07-29 RX ADMIN — DEXAMETHASONE SODIUM PHOSPHATE 10 MG: 4 INJECTION, SOLUTION INTRAMUSCULAR; INTRAVENOUS at 01:57

## 2019-07-29 NOTE — ED TRIAGE NOTES
Headache since Thursday, seen here on Friday and had a workup. HA on L top of head is getting worse, pt does not know if she recently hit her head, says this is the worst HA she has ever had. Pt is nauseated.  Pt brought to ER by friend.

## 2019-07-29 NOTE — ED PROVIDER NOTES
History     Chief Complaint   Patient presents with     Headache     HPI  Janel Calzada is a 38 year old female who has past medical history significant for asthma, alcohol abuse, fibromyalgia, migraine headaches, and recent ED visit 2 days ago for headache, and now presents with ongoing left-sided headache symptoms.  Patient reports no alleviation with home medications including ibuprofen, in addition to Norco.  Patient had visit to the emergency department 2 days ago.  I reviewed that visit, in addition to the multiple laboratory tests, which included lumbar puncture procedure.  The CSF culture remains negative at this point.  Serum blood tests showed slightly elevated white blood cell count during the visit, nonspecific in nature.    Patient with ongoing left-sided headache, severe in nature, unrelenting, worsened with light.  No vomiting has been noted.  No new trauma, or other injury.  No fever.  No sore throat.  Her sinus congestion symptoms have actually improved.    Allergies:  Allergies   Allergen Reactions     Amoxicillin      Doxycycline      Penicillins        Problem List:    Patient Active Problem List    Diagnosis Date Noted     Alcohol abuse, episodic 09/30/2017     Priority: Medium     Overview:   Created by Conversion       Asthma 09/30/2017     Priority: Medium     Overview:   Created by Conversion       Chronic pancreatitis (H) 09/30/2017     Priority: Medium     Overview:   Created by Conversion       Constipation due to pain medication 09/30/2017     Priority: Medium     Leukocytosis 09/30/2017     Priority: Medium     Alcohol induced acute pancreatitis 04/26/2017     Priority: Medium     Anxiety 04/26/2017     Priority: Medium     Fibromyalgia 04/26/2017     Priority: Medium     ADD (attention deficit disorder) 10/29/2016     Priority: Medium     Bradycardia 10/22/2016     Priority: Medium     Alcohol dependence with alcohol-induced anxiety disorder (H) 10/21/2016     Priority: Medium      Acute diverticulitis 05/21/2016     Priority: Medium     Diverticulitis 05/20/2016     Priority: Medium     Sigmoid diverticulitis 05/20/2016     Priority: Medium     S/P hysterectomy 03/04/2016     Priority: Medium     GERD (gastroesophageal reflux disease) 01/04/2015     Priority: Medium     Depression 07/16/2014     Priority: Medium     Acute pancreatitis 07/14/2014     Priority: Medium        Past Medical History:    Past Medical History:   Diagnosis Date     ADHD      Allergic state      Anxiety      Cancer (H)      Depressive disorder        Past Surgical History:    Past Surgical History:   Procedure Laterality Date     GYN SURGERY       HYSTERECTOMY         Family History:    No family history on file.    Social History:  Marital Status:  Single [1]  Social History     Tobacco Use     Smoking status: Current Every Day Smoker     Packs/day: 1.00     Years: 16.00     Pack years: 16.00     Types: Cigarettes     Smokeless tobacco: Never Used   Substance Use Topics     Alcohol use: No     Drug use: No        Medications:      ketorolac (TORADOL) 10 MG tablet   metoclopramide (REGLAN) 10 MG tablet   amphetamine-dextroamphetamine (ADDERALL) 20 MG tablet   FLUoxetine (PROZAC) 20 MG capsule   gabapentin (NEURONTIN) 300 MG capsule   HYDROcodone-acetaminophen (NORCO) 5-325 MG tablet   hydrOXYzine (ATARAX) 50 MG tablet   ibuprofen (ADVIL/MOTRIN) 800 MG tablet   omeprazole 20 MG tablet   ondansetron (ZOFRAN ODT) 4 MG ODT tab   ondansetron (ZOFRAN) 8 MG tablet   oxyCODONE-acetaminophen (PERCOCET) 5-325 MG tablet   topiramate (TOPAMAX) 50 MG tablet         Review of Systems   Constitutional: Negative for fever.   Respiratory: Negative for shortness of breath.    Cardiovascular: Negative for chest pain.   Gastrointestinal: Negative for abdominal pain.   Neurological: Positive for headaches.   All other systems reviewed and are negative.      Physical Exam   BP: 125/75  Pulse: 90  Heart Rate: 53  Temp: 98.4  F (36.9  " C)  Resp: 20  Height: 170.2 cm (5' 7\")  Weight: 63.5 kg (140 lb)  SpO2: 100 %      Physical Exam  /75   Pulse 90   Temp 98.4  F (36.9  C)   Resp 14   Ht 1.702 m (5' 7\")   Wt 63.5 kg (140 lb)   SpO2 96%   BMI 21.93 kg/m    General: alert, interactive, uncomfortable appearing, laying in bed with sunglasses on.   Head: atraumatic  Nose: no rhinorrhea or epistaxis  Ears: no external auditory canal discharge or bleeding.    Eyes: Sclera nonicteric. Conjunctiva noninjected. PERRL, EOMI  Mouth: no tonsillar erythema, edema, or exudate  Neck: supple, no palp LAD  Lungs: CTAB  CV: RRR, S1/S2; peripheral pulses palpable and symmetric  Abdomen: soft, nt, nd, no guarding or rebound. Positive bowel sounds  Extremities: no cyanosis or edema  Skin: no rash or diaphoresis  Neuro: CN II-XII grossly intact, strength 5/5 in UE and LEs bilaterally, sensation intact to light touch in UE and LEs bilaterally;       ED Course        Procedures               Critical Care time:  none               No results found for this or any previous visit (from the past 24 hour(s)).    Medications   OLANZapine (zyPREXA) injection 10 mg (10 mg Intramuscular Given 7/28/19 2245)   ketorolac (TORADOL) injection 30 mg (has no administration in time range)   acetaminophen (TYLENOL) tablet 975 mg (975 mg Oral Given 7/28/19 2246)       Assessments & Plan (with Medical Decision Making)  38 year old female, presenting to the emergency department with headache, left-sided in nature, unrelenting, with no alleviating factors.  Patient arrives anxious appearing, fidgety, tearful, and does report significant amounts of recent stresses.    I reviewed visit from 2 days ago.  Patient had negative laboratory work-up with the exception of slightly elevated white blood cell count in the serum.  Her CSF studies to date have shown no growth, with no abnormal values.  Patient does report head trauma a couple weeks ago with bumping of the head, however with  no " blood in the CSF studies, there is no indication for head CT imaging, or other imaging procedures to be performed.    Patient given Zyprexa, and Tylenol.  Patient reassessed at 11:30 PM, and is resting comfortably in the room, with pain which is much improved.    At this point, do not feel that further advanced imaging studies, or other testing is indicated.  Patient likely with acute on chronic headache, and now improved after above medications.  Patient was reassessed on multiple occasions.  I had long conversation with mother, in addition to patient when both were in the room.  I discussed that I do not feel any additional testing is indicated.  She had thorough laboratory testing performed 2 days ago.  Patient was given multiple medications, and upon multiple rechecks, patient was found sleeping in the room, and I had to touch the patient in order to awaken her.  Therefore, does not appear to be in any distress, however upon awakening she states that she still has the headache.  She does seem anxious.  This is likely a contributing factor.  Mother did report that patient has had social issues as well, with difficulty obtaining housing.  She has outpatient , with appointment in the middle of August.    Patient without any infectious symptoms, and had negative laboratory work-up recently.  She was given multiple medications in the emergency department, and did feel improved, however headache is not entirely gone.  I discussed this with the patient, and instructed continue Tylenol, ibuprofen, Reglan, and Toradol was prescribed, with instructions not to take both Toradol, and NSAIDs.  Patient has 2 tablets of Norco remaining.  I do not feel additional Norco is indicated.    I also considered post spinal headache.  Patient has tried caffeine at home.  Headache seems to have not significantly changed since the lumbar puncture, and has been constant over the past approximately 4 days.  Therefore, I feel  that this is less likely post spinal.     I have reviewed the nursing notes.    I have reviewed the findings, diagnosis, plan and need for follow up with the patient.          Medication List      Started    ketorolac 10 MG tablet  Commonly known as:  TORADOL  10 mg, Oral, EVERY 6 HOURS PRN     metoclopramide 10 MG tablet  Commonly known as:  REGLAN  10 mg, Oral, 3 TIMES DAILY PRN            Final diagnoses:   Intractable episodic tension-type headache       7/28/2019   [facility] EMERGENCY DEPARTMENT     Eric Wu MD  07/29/19 0047       Eric Wu MD  07/29/19 0153

## 2019-07-29 NOTE — ED NOTES
MD at bedside.      Pt presents with c/o migraine.  States pain present since Thursday, was seen in ED at that time.  Pt states nothing is helping.  Tearful during assessment.  Has ice pack applied to top of head.  Presents with friend.  Wearing sunglasses d/t light sensitivity.

## 2019-07-29 NOTE — DISCHARGE INSTRUCTIONS
Tylenol and ibuprofen as needed for pain.    You can try reglan as well for migraine.    Benadryl can also be taken - this may help you to sleep at night.

## 2019-07-30 LAB
A PHAGOCYTOPH IGG TITR SER IF: NORMAL {TITER}
A PHAGOCYTOPH IGM TITR SER IF: NORMAL {TITER}
WNV IGG CSF-ACNC: 0.04 IV
WNV IGM CSF-ACNC: 0 IV

## 2019-07-31 LAB
BACTERIA SPEC CULT: NO GROWTH
Lab: NORMAL
SPECIMEN SOURCE: NORMAL

## 2021-02-24 ENCOUNTER — RECORDS - HEALTHEAST (OUTPATIENT)
Dept: LAB | Facility: CLINIC | Age: 41
End: 2021-02-24

## 2021-02-26 LAB — BACTERIA SPEC CULT: NORMAL

## 2021-03-25 ENCOUNTER — RECORDS - HEALTHEAST (OUTPATIENT)
Dept: LAB | Facility: CLINIC | Age: 41
End: 2021-03-25

## 2021-03-26 LAB
COVID-19 ANTIBODY IGG: NEGATIVE
SARS-COV-2 PCR COMMENT: NORMAL
SARS-COV-2 RNA SPEC QL NAA+PROBE: NEGATIVE
SARS-COV-2 VIRUS SPECIMEN SOURCE: NORMAL

## 2021-05-24 ENCOUNTER — RECORDS - HEALTHEAST (OUTPATIENT)
Dept: ADMINISTRATIVE | Facility: CLINIC | Age: 41
End: 2021-05-24

## 2021-05-27 ENCOUNTER — COMMUNICATION - HEALTHEAST (OUTPATIENT)
Dept: SCHEDULING | Facility: CLINIC | Age: 41
End: 2021-05-27

## 2021-05-29 ENCOUNTER — RECORDS - HEALTHEAST (OUTPATIENT)
Dept: ADMINISTRATIVE | Facility: CLINIC | Age: 41
End: 2021-05-29

## 2021-05-31 ENCOUNTER — COMMUNICATION - HEALTHEAST (OUTPATIENT)
Dept: SCHEDULING | Facility: CLINIC | Age: 41
End: 2021-05-31

## 2021-05-31 ENCOUNTER — NURSE TRIAGE (OUTPATIENT)
Dept: NURSING | Facility: CLINIC | Age: 41
End: 2021-05-31

## 2021-05-31 ENCOUNTER — HOSPITAL ENCOUNTER (EMERGENCY)
Dept: EMERGENCY MEDICINE | Facility: HOSPITAL | Age: 41
Discharge: HOME | End: 2021-05-31
Attending: EMERGENCY MEDICINE
Payer: COMMERCIAL

## 2021-05-31 DIAGNOSIS — K86.1 ACUTE ON CHRONIC PANCREATITIS (H): ICD-10-CM

## 2021-05-31 DIAGNOSIS — K85.90 ACUTE ON CHRONIC PANCREATITIS (H): ICD-10-CM

## 2021-05-31 NOTE — TELEPHONE ENCOUNTER
Patient calling, opened patient's chart in [facility].     Gina Guzman RN/[facility] Nurse Advisors

## 2021-06-02 ENCOUNTER — RECORDS - HEALTHEAST (OUTPATIENT)
Dept: LAB | Facility: CLINIC | Age: 41
End: 2021-06-02

## 2021-06-02 ENCOUNTER — RECORDS - HEALTHEAST (OUTPATIENT)
Dept: ADMINISTRATIVE | Facility: CLINIC | Age: 41
End: 2021-06-02

## 2021-06-02 LAB — LIPASE SERPL-CCNC: 585 U/L (ref 0–52)

## 2021-06-03 ENCOUNTER — RECORDS - HEALTHEAST (OUTPATIENT)
Dept: ADMINISTRATIVE | Facility: CLINIC | Age: 41
End: 2021-06-03

## 2021-06-04 ENCOUNTER — RECORDS - HEALTHEAST (OUTPATIENT)
Dept: ADMINISTRATIVE | Facility: OTHER | Age: 41
End: 2021-06-04

## 2021-06-04 ASSESSMENT — MIFFLIN-ST. JEOR: SCORE: 1383.03

## 2021-06-05 ENCOUNTER — RECORDS - HEALTHEAST (OUTPATIENT)
Dept: EMERGENCY MEDICINE | Facility: HOSPITAL | Age: 41
End: 2021-06-05

## 2021-06-05 ENCOUNTER — COMMUNICATION - HEALTHEAST (OUTPATIENT)
Dept: SCHEDULING | Facility: CLINIC | Age: 41
End: 2021-06-05

## 2021-06-05 ENCOUNTER — RECORDS - HEALTHEAST (OUTPATIENT)
Dept: ADMINISTRATIVE | Facility: CLINIC | Age: 41
End: 2021-06-05

## 2021-06-05 ENCOUNTER — RECORDS - HEALTHEAST (OUTPATIENT)
Dept: ADMINISTRATIVE | Facility: OTHER | Age: 41
End: 2021-06-05

## 2021-06-05 ASSESSMENT — MIFFLIN-ST. JEOR: SCORE: 1353.09

## 2021-06-06 ENCOUNTER — SURGERY - HEALTHEAST (OUTPATIENT)
Dept: SURGERY | Facility: HOSPITAL | Age: 41
End: 2021-06-06
Payer: COMMERCIAL

## 2021-06-06 ENCOUNTER — ANESTHESIA - HEALTHEAST (OUTPATIENT)
Dept: SURGERY | Facility: HOSPITAL | Age: 41
End: 2021-06-06

## 2021-06-09 ENCOUNTER — AMBULATORY - HEALTHEAST (OUTPATIENT)
Dept: SURGERY | Facility: CLINIC | Age: 41
End: 2021-06-09

## 2021-06-09 DIAGNOSIS — G89.18 POSTOPERATIVE PAIN: ICD-10-CM

## 2021-06-10 ENCOUNTER — RECORDS - HEALTHEAST (OUTPATIENT)
Dept: ADMINISTRATIVE | Facility: OTHER | Age: 41
End: 2021-06-10

## 2021-06-10 ASSESSMENT — MIFFLIN-ST. JEOR
SCORE: 1379.06
SCORE: 1341.41

## 2021-06-11 ENCOUNTER — ANESTHESIA - HEALTHEAST (OUTPATIENT)
Dept: SURGERY | Facility: HOSPITAL | Age: 41
End: 2021-06-11

## 2021-06-11 ENCOUNTER — SURGERY - HEALTHEAST (OUTPATIENT)
Dept: SURGERY | Facility: HOSPITAL | Age: 41
End: 2021-06-11
Payer: COMMERCIAL

## 2021-06-11 ENCOUNTER — COMMUNICATION - HEALTHEAST (OUTPATIENT)
Dept: SCHEDULING | Facility: CLINIC | Age: 41
End: 2021-06-11

## 2021-06-12 ENCOUNTER — RECORDS - HEALTHEAST (OUTPATIENT)
Dept: ADMINISTRATIVE | Facility: OTHER | Age: 41
End: 2021-06-12

## 2021-06-12 ASSESSMENT — MIFFLIN-ST. JEOR: SCORE: 1365.9

## 2021-06-15 ENCOUNTER — AMBULATORY - HEALTHEAST (OUTPATIENT)
Dept: SURGERY | Facility: HOSPITAL | Age: 41
End: 2021-06-15

## 2021-06-15 ENCOUNTER — RECORDS - HEALTHEAST (OUTPATIENT)
Dept: ADMINISTRATIVE | Facility: OTHER | Age: 41
End: 2021-06-15

## 2021-06-15 DIAGNOSIS — Z11.59 ENCOUNTER FOR SCREENING FOR OTHER VIRAL DISEASES: ICD-10-CM

## 2021-06-17 ENCOUNTER — OFFICE VISIT (OUTPATIENT)
Dept: PHARMACY | Facility: PHYSICIAN GROUP | Age: 41
End: 2021-06-17
Payer: COMMERCIAL

## 2021-06-17 DIAGNOSIS — K59.00 CONSTIPATION, UNSPECIFIED CONSTIPATION TYPE: ICD-10-CM

## 2021-06-17 DIAGNOSIS — G89.4 CHRONIC PAIN SYNDROME: ICD-10-CM

## 2021-06-17 DIAGNOSIS — M79.7 FIBROMYALGIA: ICD-10-CM

## 2021-06-17 DIAGNOSIS — K86.1 CHRONIC PANCREATITIS, UNSPECIFIED PANCREATITIS TYPE (H): Primary | ICD-10-CM

## 2021-06-17 DIAGNOSIS — F90.9 ATTENTION DEFICIT HYPERACTIVITY DISORDER (ADHD), UNSPECIFIED ADHD TYPE: ICD-10-CM

## 2021-06-17 DIAGNOSIS — F32.9 MAJOR DEPRESSIVE DISORDER, REMISSION STATUS UNSPECIFIED, UNSPECIFIED WHETHER RECURRENT: ICD-10-CM

## 2021-06-17 DIAGNOSIS — F41.9 ANXIETY: ICD-10-CM

## 2021-06-17 DIAGNOSIS — G40.909 SEIZURE DISORDER (H): ICD-10-CM

## 2021-06-17 PROCEDURE — 99607 MTMS BY PHARM ADDL 15 MIN: CPT | Performed by: PHARMACIST

## 2021-06-17 PROCEDURE — 99605 MTMS BY PHARM NP 15 MIN: CPT | Performed by: PHARMACIST

## 2021-06-17 NOTE — PROGRESS NOTES
Medication Therapy Management (MTM) Encounter    ASSESSMENT:                            Medication Adherence/Access: Patient would benefit from ongoing MTM follow-up to help update medication list.    Recurrent Pancreatitis: Needs further follow-up with GI. She has appointment with Dr. Borrero later today and will discuss cysts on pancreas with her.       Depression, Anxiety: Further clarification needed on if she is transitioning from fluoxetine to desvenlafaxine or if provider wanted her to take both simultaneously. Given her complex medication regimen and failure of previous medication trials, it is reasonable to pursue pharmacogenetic testing to guide decisions about which medications to prescribe and/or avoid for this patient, or to guide decisions concerning dosing for current medications, identify possible alternative medications which may yield a better therapeutic response for this patient than she  is currently experiencing, and identify medications that should be avoided due to elevated risk of adverse effects for this patient.  Education Provided:  - Potential impact of pharmacokinetic and pharmacodynamic genetic variation on medication metabolism and action  - Reviewed genes tested  - Reviewed what report will look like  - How information may be helpful in guiding treatment  - How results may be useful when reviewing safety of other medications  - Limitations of test results on individual medication experience and other factors that impact medication selection  Patient Consent Form reviewed with patient, patient provided opportunity to ask questions, and confirmed understanding.    Chronic pain, Fibromyalgia: Continue to monitor NSAID use given risk for GI bleeding. Diclofenac is more LEIJA-2 selective so may be a better option than ibuprofen. Patient will discuss refill of diclofenac with Dr. Borrero at visit later today.    Seizure disorder: Stable.     ADHD: Stable.     Constipation: She would benefit  from using senna-docusate daily.    PLAN:                            1. Cheek swab collected and sample sent to [facility] for processing.  2. Continue to take all of your current medications until we have your results and come up with a plan with your doctor.     3. Once your results are back I will call you to let you know and setup a time for us to talk through the results in clinic.   4. Talk to Dr. Borrero about refills on your montelukast, diclofenac, and senna-docusate at appointment today    Follow-up: Return in about 1 month (around 7/20/2021) for medication therapy management follow-up to review [facility] results and med list.      SUBJECTIVE/OBJECTIVE:                          Janel Calzada is a 40 year old female coming in for a transitions of care visit. She was discharged from [facility] on 6/12/2021 for recurrent pancreatitis and biliary obstruction. Prior to hospitalization she was also referred to [facility] for medication review and pharmacogenetic testing.      Reason for visit: Hospital follow-up medication review and discuss pharmacogenetic testing.    Allergies/ADRs: Reviewed in chart  Tobacco: She reports that she has been smoking cigarettes. She has a 16.00 pack-year smoking history. 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    Medication Adherence/Access: She has [employer] insurance. She takes medications directly from pill bottles because she has run into issues with having medications in pillboxes that could not be identified easily when she has been pulled over in the past. She takes a lot of  medications but denies issues with forgetting to take medications or pill burden.    Recurrent Pancreatitis:   Patient has been hospitalized for symptoms related to pancreatis several times since May. She underwent lap cholecystectomy on 6/6/21. After discharge she had worsening stomach pain and returned to hospital. She had a CT scan that showed two cysts on her pancreas and she is concerned about what these mean. She did not get a good explanation while at the hospital. She is supposed to have an appointment with GI doctor in a few weeks for further workup. She reports no current or recent alcohol use. She continues to have a lot of abdominal pain and had to sparingly use supply of pain medication provided at discharge. She was given a prescription for #8 tramadol and #10 oxycodone-acetaminophen.       Depression, Anxiety:   Current medication(s):   Fluoxetine 20 mg once daily  Desvenlafaxine 50 mg once daily- insurance hasn't approved PA for this  Hydroxyzine 25-50 mg every 4 hours as needed  She continues to struggle with anxiety and depression symptoms. She has been taking fluoxetine for awhile. Dr. Borrero tried to change her to desvenlafaxine but her insurance isn't approving prescription. She wants to get printed prescription for desvenlafaxine to get at different pharmacy using "[employer] coupon instead of insurance.     Chronic pain, Fibromyalgia:   Current medications:   Gabapentin 900 mg three times daily  Diclofenac 75 mg twice a day- no current supply  Acetaminophen 500-1000 mg every 6 hours as needed   She finished supply of pain medication she was given for acute abdominal pain post-procedure. She takes gabapentin for fibromyalgia and seizure disorder. She wants a refill of diclofenac. She does not think this worsens her stomach pain or GERD. She is prescribed omeprazole but her insurance only allows her to refill so much each year so she has to buy it over the counter.     Seizure disorder:   Current  medications:   Levetiracetam 1000 mg twice a day   Topiramate 150 mg every morning  Gabapentin 900 mg three times daily  She reports having a seizure during recent admission but prior to that it had been awhile. Topiramate has worked best for her and she does not experience sedation with taking it in the morning or foggy thinking or other cognitive side effects. Levetiracetam was added after last seizure she had. She doesn't have any side effects with it. She is concerned that the abdominal pain and nausea she has makes her vomit and she may not be getting full dose of seizure medication. Not clear if vomiting has continued since discharge or if this was prior to hospitalization.     Levetiracetam [Keppra ]Resulted: 5/28/2021 12:05 PM [facility]  Component Name Value Ref Range   Levetiracetam 25.3 6.0 - 46.0 ug/mL       ADHD:   Current medication: Adderall 20 mg twice a day   She thinks this medication works well for her. No side effects reported.    Constipation:   Current medication: Senna-docusate 8.6-50 mg twice a day, Polyethylene glycol 1 scoop daily as needed   She finally had a good bowel movement yesterday, first since discharge. She used polyethylene glycol and this helped. She was not given refill of senna-docusate at discharge and she has been out of this. Plans to ask Dr. Borrero for refill at visit later today.    Today's Vitals: There were no vitals taken for this visit.  ----------------  Post Discharge Medication Reconciliation Status: discharge medications reconciled and changed, per note/orders.    I spent 60 minutes with this patient today. All changes were made via collaborative practice agreement with Robina Borrero MD. A copy of the visit note was provided to the patient's primary care provider.    The patient was given a summary of these recommendations.     Britney Garcia, PharmD, BCACP  Medication Therapy Management Pharmacist  [facility]  Pager: [phone]           Medication Therapy Recommendations  Depression    Current Medication: FLUoxetine (PROZAC) 20 MG capsule   Rationale: Condition refractory to medication - Ineffective medication - Effectiveness   Recommendation: Order Lab - Pharmacogenetic tesing ordered to help guide medication changes   Status: Accepted per CPA

## 2021-06-18 ENCOUNTER — COMMUNICATION - HEALTHEAST (OUTPATIENT)
Dept: RESPIRATORY THERAPY | Facility: HOSPITAL | Age: 41
End: 2021-06-18

## 2021-06-22 RX ORDER — MONTELUKAST SODIUM 10 MG/1
10 TABLET ORAL AT BEDTIME
COMMUNITY

## 2021-06-22 RX ORDER — DESVENLAFAXINE 50 MG/1
50 TABLET, FILM COATED, EXTENDED RELEASE ORAL DAILY
COMMUNITY

## 2021-06-22 RX ORDER — ALBUTEROL SULFATE 90 UG/1
2 AEROSOL, METERED RESPIRATORY (INHALATION) 4 TIMES DAILY PRN
COMMUNITY

## 2021-06-22 RX ORDER — FLUTICASONE PROPIONATE AND SALMETEROL XINAFOATE 230; 21 UG/1; UG/1
2 AEROSOL, METERED RESPIRATORY (INHALATION) 2 TIMES DAILY
COMMUNITY

## 2021-06-22 RX ORDER — DICLOFENAC SODIUM 75 MG/1
75 TABLET, DELAYED RELEASE ORAL 2 TIMES DAILY
COMMUNITY

## 2021-06-22 RX ORDER — LEVETIRACETAM 1000 MG/1
1000 TABLET ORAL 2 TIMES DAILY
COMMUNITY

## 2021-06-22 RX ORDER — ALBUTEROL SULFATE 0.83 MG/ML
2.5 SOLUTION RESPIRATORY (INHALATION) EVERY 6 HOURS PRN
COMMUNITY

## 2021-06-22 NOTE — PATIENT INSTRUCTIONS
Recommendations from today's MTM visit:                                                    MTM (medication therapy management) is a service provided by a clinical pharmacist designed to help you get the most of out of your medicines.   Today we reviewed what your medicines are for, how to know if they are working, that your medicines are safe and how to make your medicine regimen as easy as possible.      1. Cheek swab collected and sample sent to [facility] for processing.  2. Continue to take all of your current medications until we have your results and come up with a plan with your doctor.     3. Once your results are back I will call you to let you know and setup a time for us to talk through the results in clinic.   4. Talk to Dr. Borrero about refills on your montelukast, diclofenac, and senna-docusate at appointment today    Follow-up: Return in about 1 month (around 7/20/2021) for medication therapy management follow-up to review [facility] results and med list.    It was great to speak with you today.  I value your experience and would be very thankful for your time with providing feedback on our clinic survey. You may receive a survey via email or text message in the next few days.     To schedule another MTM appointment, please call the clinic directly or you may call the MTM scheduling line at [phone] or toll-free at [phone].     My Clinical Pharmacist's contact information:                                                      Please feel free to contact me with any questions or concerns you have.      Britney Garcia, PharmD, BCACP  Medication Therapy Management Pharmacist  [facility]  Pager: [phone]

## 2021-06-25 NOTE — ED NOTES
"When the patient came out of the restroom and noticed writer of note sitting at desk she began to groan and hold her abdomen again and asked \"is someone coming to see me soon or not?\"  Writer of note notified patient that the provider would be in shortly.     Patient continues to groan in her room after closing the curtain.    "

## 2021-06-25 NOTE — ED TRIAGE NOTES
Pt was discharged from hospital this morning and she is back with uncontrolled pain and nausea. She was not able to get her medications filled because the pharmacies are all closed.

## 2021-06-25 NOTE — TELEPHONE ENCOUNTER
Patient is calling about her discharge medications. Patient was discharged from [facility] today for pancreatitis. Patient states her prescriptions were sent to a pharmacy which is closed today (Oxycodone and Ativan prescription). Patient states she is feeling very anxious. Phone call became disconnected. RN attempted to call patient back and left a voice mail message to call back.     Gina Guzman RN/[facility] Nurse Advisors

## 2021-06-25 NOTE — ED NOTES
Patient stopped crying and got up and ambulated to restroom without difficulty and without guarding her abdomen.  Patient did not appear to be in any pain when ambulating to restroom.

## 2021-06-25 NOTE — TELEPHONE ENCOUNTER
Patient calling - is asking for ativan and oxycodone prescriptions to be re-sent to a different pharmacy.  Advised caller we cannot re-send controlled substances after hours.    Deepali Givens RN  Triage Nurse Advisor

## 2021-06-26 NOTE — TELEPHONE ENCOUNTER
Spoke with Janel. She states that she has cut down on the number of cigarettes she is smoking to less than 1/2 pack/day. She has not received any NRT yet, but states that she will contact her PCP and request a prescription for patches and gum or lozenges. I also reminded her that she can get free NRT from [facility] [facility]. We spoke about strategies to combat cravings and triggers. Janel appears motivated to quit smoking but has been preoccupied with pain and an upcoming surgery. We discussed how refraining from nicotine can help the body heal better.  I will attempt to check in with Janel in a few weeks and she has my contact information if she should need something.    Raquel Lira, LRT, TTS, Chronic Pulmonary Disease Specialist

## 2021-06-26 NOTE — ANESTHESIA CARE TRANSFER NOTE
Last vitals:   Vitals:    06/06/21 1858   BP: 140/74   Pulse: (!) 102   Resp: 22   Temp: 36.2  C (97.1  F)   SpO2: 96%     Patient's level of consciousness is drowsy  Spontaneous respirations: yes  Maintains airway independently: yes  Dentition unchanged: yes  Oropharynx: oropharynx clear of all foreign objects    QCDR Measures:  ASA# 20 - Surgical Safety Checklist: WHO surgical safety checklist completed prior to induction    PQRS# 430 - Adult PONV Prevention: 4558F - Pt received => 2 anti-emetic agents (different classes) preop & intraop  ASA# 8 - Peds PONV Prevention: NA - Not pediatric patient, not GA or 2 or more risk factors NOT present  PQRS# 424 - Linda-op Temp Management: 4559F - At least one body temp DOCUMENTED => 35.5C or 95.9F within required timeframe  PQRS# 426 - PACU Transfer Protocol: - Transfer of care checklist used  ASA# 14 - Acute Post-op Pain: ASA14B - Patient did NOT experience pain >= 7 out of 10

## 2021-06-26 NOTE — ANESTHESIA CARE TRANSFER NOTE
Last vitals:   Vitals:    06/11/21 0743   BP: 124/68   Pulse: 73   Resp: 18   Temp: 36.7  C (98.1  F)   SpO2: 97%     Patient's level of consciousness is drowsy  Spontaneous respirations: yes  Maintains airway independently: yes  Dentition unchanged: yes  Oropharynx: oropharynx clear of all foreign objects    QCDR Measures:  ASA# 20 - Surgical Safety Checklist: WHO surgical safety checklist completed prior to induction    PQRS# 430 - Adult PONV Prevention: 4558F - Pt received => 2 anti-emetic agents (different classes) preop & intraop  ASA# 8 - Peds PONV Prevention: NA - Not pediatric patient, not GA or 2 or more risk factors NOT present  PQRS# 424 - Linda-op Temp Management: 4559F - At least one body temp DOCUMENTED => 35.5C or 95.9F within required timeframe  PQRS# 426 - PACU Transfer Protocol: - Transfer of care checklist used  ASA# 14 - Acute Post-op Pain: ASA14B - Patient did NOT experience pain >= 7 out of 10

## 2021-06-26 NOTE — ANESTHESIA POSTPROCEDURE EVALUATION
Patient: Janel Calzada  Procedure(s):  CHOLECYSTECTOMY, LAPAROSCOPIC  Anesthesia type: general    Patient location: PACU  Last vitals:   Vitals Value Taken Time   /93 06/06/21 2039   Temp 36.3  C (97.3  F) 06/06/21 2039   Pulse 64 06/06/21 2039   Resp 18 06/06/21 2039   SpO2 98 % 06/06/21 2039     Post vital signs: stable  Level of consciousness: awake and responds to simple questions  Post-anesthesia pain: pain controlled  Post-anesthesia nausea and vomiting: no  Pulmonary: unassisted, return to baseline  Cardiovascular: stable and blood pressure at baseline  Hydration: adequate  Anesthetic events: no    QCDR Measures:  ASA# 11 - Linda-op Cardiac Arrest: ASA11B - Patient did NOT experience unanticipated cardiac arrest  ASA# 12 - Linda-op Mortality Rate: ASA12B - Patient did NOT die  ASA# 13 - PACU Re-Intubation Rate: ASA13B - Patient did NOT require a new airway mgmt  ASA# 10 - Composite Anes Safety: ASA10A - No serious adverse event    Additional Notes:

## 2021-06-26 NOTE — ANESTHESIA POSTPROCEDURE EVALUATION
Patient: Janel Calzada  Procedure(s):  ENDOSCOPIC RETROGRADE CHOLANGIOPANCREATOGRAPHY,SPHINTEROTOMY ,BRUSHINGS AND BILARY STENT PLACEMENT  Anesthesia type: general    Patient location: PACU  Last vitals:   Vitals Value Taken Time   /87 06/11/21 1200   Temp 36.8  C (98.2  F) 06/11/21 1105   Pulse 73 06/11/21 1214   Resp 11 06/11/21 1214   SpO2 98 % 06/11/21 1214   Vitals shown include unvalidated device data.  Post vital signs: stable  Level of consciousness: awake and responds to simple questions  Post-anesthesia pain: pain controlled  Post-anesthesia nausea and vomiting: no  Pulmonary: unassisted, face mask  Cardiovascular: stable and blood pressure at baseline  Hydration: adequate  Anesthetic events: no    QCDR Measures:  ASA# 11 - Linda-op Cardiac Arrest: ASA11B - Patient did NOT experience unanticipated cardiac arrest  ASA# 12 - Linda-op Mortality Rate: ASA12B - Patient did NOT die  ASA# 13 - PACU Re-Intubation Rate: ASA13B - Patient did NOT require a new airway mgmt  ASA# 10 - Composite Anes Safety: ASA10A - No serious adverse event    Additional Notes:

## 2021-06-26 NOTE — ED PROVIDER NOTES
"EMERGENCY DEPARTMENT ENCOUNTER      NAME: Janel Calzada  AGE: 40 y.o. female  YOB: 1980  MRN: [id number]  EVALUATION DATE & TIME: 5/31/2021  7:03 PM    PCP: Robina Borrero MD    ED PROVIDER: Magdalena Watkins M.D.    Chief Complaint   Patient presents with     Abdominal Pain         FINAL IMPRESSION:  1. Acute on chronic pancreatitis (H)        MEDICAL DECISION MAKING:    Pertinent Labs & Imaging studies reviewed. (See chart for details)    40 y.o. female presents to the Emergency Department for evaluation of medication refill and abdominal pain.  Records reviewed.  Patient was just discharged earlier today after an admission for management of acute on chronic pancreatitis.  She was discharged with prescriptions for oxycodone and Ativan but reports that she was not able to fill them, as the pharmacy was closed.  She has had recurrence of abdominal and back pain as well as nausea which she states is consistent with past episodes of pancreatitis.  She was feeling a bit better at time of discharge but had recurrence this afternoon and wonders if she might of \"overdid it.\"  Vitals on arrival stable and reassuring. Remainder of history and physical exam, as below.     At this point, I do feel that history and exam is consistent with acute on chronic pain, likely related to patient not having received her discharge medications.  As she just had a very extensive evaluation hospitalization, I see no indication to repeat imaging or laboratory work-up.  Patient appears well-hydrated and well-nourished and although tearful and anxious, relatively comfortable and in no acute distress.  Had a long discussion with the patient about options for management.  Ultimately, she did feel comfortable with plan for trial of p.o. medications here and if this goes well, discharged with new prescriptions that she can take to a 24-hour pharmacy.  She already has a well-developed discharge plan that she was sent home with " earlier today and will continue to follow through with this.    Patient tolerated p.o. without difficulty.  She was given Zofran, Percocet, Ativan, and Vistaril and after these interventions, felt eager and ready to go home.  I discontinued the Percocet and Ativan prescriptions written by discharge physician and wrote new prescriptions which were printed and sent with the patient.  She was able to find a 24-hour pharmacy and plans to go there now.    The importance of close follow up was discussed. We reviewed warning signs and symptoms, and I instructed Ms. Calzada to return to the emergency department immediately if she develops any new or worsening symptoms. I provided additional verbal discharge instructions. Ms. Cazlada expressed understanding and agreement with this plan of care, her questions were answered, and she was discharged in stable condition.      ED COURSE:  7:46 PM I met with patient for initial interview and exam.   7:51 PM Rechecked patient and updated them on results and plan of care.   8:41 PM Patient is requesting her Vistaril. She is comfortable going home.    MEDICATIONS GIVEN IN THE ED:  Medications   oxyCODONE-acetaminophen 5-325 mg 1 tablet (PERCOCET/ENDOCET) (1 tablet Oral Given 5/31/21 2008)   LORazepam tablet 1 mg (ATIVAN) (1 mg Oral Given 5/31/21 2008)   ondansetron disintegrating tablet 4 mg (ZOFRAN-ODT) (4 mg Oral Given 5/31/21 2008)   hydrOXYzine pamoate capsule 25 mg (VISTARIL) (25 mg Oral Given 5/31/21 2050)       NEW PRESCRIPTIONS STARTED AT TODAY'S VISIT:  Discharge Medication List as of 5/31/2021  8:50 PM      CONTINUE these medications which have CHANGED    Details   LORazepam (ATIVAN) 1 MG tablet Take 0.5 tablets (0.5 mg total) by mouth every 6 (six) hours as needed for anxiety., Starting Mon 5/31/2021, Print      oxyCODONE-acetaminophen (PERCOCET/ENDOCET) 5-325 mg per tablet Take 1 tablet by mouth every 4 (four) hours as needed for pain., Starting Mon 5/31/2021, Print          CONTINUE these medications which have NOT CHANGED    Details   acetaminophen (TYLENOL) 325 MG tablet Take 650 mg by mouth every 6 (six) hours as needed for pain., Until Discontinued, Historical Med      albuterol (PROVENTIL HFA;VENTOLIN HFA) 90 mcg/actuation inhaler Inhale 2 puffs every 6 (six) hours as needed for wheezing., Until Discontinued, Historical Med      albuterol (PROVENTIL) 2.5 mg /3 mL (0.083 %) nebulizer solution Take 2.5 mg by nebulization every 6 (six) hours as needed for wheezing., Until Discontinued, Historical Med      cyclobenzaprine (FLEXERIL) 5 MG tablet Take 5 mg by mouth daily., Historical Med      dextroamphetamine-amphetamine (AMPHETAMINE-DEXTROAMPHETAMINE) 20 mg Tab Take 20 mg by mouth 2 times daily before breakfast and lunch., Until Discontinued, Historical Med      FLUoxetine (PROZAC) 20 MG capsule Take 40 mg by mouth at bedtime. (2 tabs), Until Discontinued, Historical Med      fluticasone propion-salmeteroL (ADVAIR HFA) 230-21 mcg/actuation inhaler Inhale 2 puffs 2 (two) times a day., Historical Med      !! gabapentin (NEURONTIN) 300 MG capsule Take 600 mg by mouth daily after supper. , Historical Med      !! gabapentin (NEURONTIN) 300 MG capsule Take 900 mg by mouth every evening., Until Discontinued, Historical Med      hydrOXYzine (VISTARIL) 25 MG capsule Take 25-50 mg by mouth every 4 (four) hours as needed for itching or anxiety. , Historical Med      levETIRAcetam (KEPPRA) 1000 MG tablet Take 1,000 mg by mouth 2 (two) times a day., Historical Med      lidocaine 4 % patch Remove and discard patch with 12 hours or as directed by MD., Normal      montelukast (SINGULAIR) 10 mg tablet Take 10 mg by mouth at bedtime., Until Discontinued, Historical Med      omeprazole (PRILOSEC) 20 MG capsule Take 20 mg by mouth daily. , Starting 12/1/2014, Until Discontinued, Historical Med      ondansetron (ZOFRAN) 8 MG tablet Take 4 mg by mouth every 12 (twelve) hours as needed for nausea. ,  Historical Med      potassium chloride (K-DUR,KLOR-CON) 20 MEQ tablet Take 40 mEq by mouth daily. X 3 days, Historical Med      topiramate (TOPAMAX) 100 MG tablet Take 150 mg by mouth at bedtime. , Starting Wed 1/10/2018, Historical Med       !! - Potential duplicate medications found. Please discuss with provider.            =================================================================    HPI  Janel Calzada is a 40 y.o. female with a pertinent history of alcohol abuse, alcohol-induced acute pancreatitis, tobacco abuse, anxiety, chronic pancreatitis, diverticulitis, fibromyalgia, ovarian cyst, and cervical cancer who presents to the ED as a walk in for evaluation of abdominal pain.     Per chart review, patient was evaluated and admitted 5/26-this morning for evaluation of abdominal pain. Patient had elevated lipase at 320 on admission, and repeated CT showed 1 cm fluid collection near junction of the head and body of pancreas with prominent pancreatic duct. Due to concerns over possible seizure, EEG and MRI were obtained, both of which were unremarkable. She was discharged on a short course of pain medications, with prescriptions for Lorazepam and oxycodone.    Patient reports her abdominal pain has returned to the level it was at prior to her last admission. She felt her pain was under control after discharge this morning, but came back this afternoon and has gotten progressively worse to the point where it is now an 8/10. She was unable to pickup her prescribed pain medications as the pharmacy is closed. The pain is in the left side of her abdomen and wraps around to the left side of her back, and she is also endorsing lower abdominal pain which she believes is due to her ovarian cysts. She endorses nausea and chills. No other complaints at this time.    REVIEW OF SYSTEMS   Review of Systems   Constitutional: Positive for appetite change (decreased) and chills. Negative for fever.   Respiratory: Negative  for shortness of breath.    Cardiovascular: Negative for chest pain.   Gastrointestinal: Positive for abdominal pain, diarrhea (chronic) and nausea. Negative for blood in stool and vomiting.   Genitourinary: Negative.    Musculoskeletal: Positive for back pain (mid).   Neurological: Negative.    All other systems reviewed and are negative.       RELEVANT HISTORY, MEDICATIONS, & ALLERGIES   Past medical, surgical, social, and family history; medications; and allergies reviewed in [facility] records. Pertinent information noted in HPI. See end of note for comprehensive list.    VITALS:  Patient Vitals for the past 24 hrs:   BP Temp Temp src Pulse Resp SpO2 Weight   05/31/21 1822 -- 98.7  F (37.1  C) Oral -- -- -- --   05/31/21 1821 132/84 -- -- 88 18 100 % 150 lb (68 kg)       PHYSICAL EXAM    Vitals: /84   Pulse 88   Temp 98.7  F (37.1  C) (Oral)   Resp 18   Wt 150 lb (68 kg)   LMP 05/20/2015   SpO2 100%   BMI 23.49 kg/m     General: Anxious and tearful appearing but in no acute distress. Alert and interactive.  HENT: Oropharynx without erythema or exudates. MMM.   Eyes: Pupils mid-sized and equally reactive.   Neck: Full AROM.   Cardiovascular: Regular rate and rhythm. Peripheral pulses 2+ bilaterally.  Chest/Pulmonary: Normal work of breathing. Lung sounds clear and equal throughout, no wheezes or crackles.   Abdomen: Soft, nondistended. Mild diffuse TTP upper abdomen without guarding or rebound.  Back/Spine: Mild TTP diffuse about bilateral flanks.   Extremities: Normal ROM of all major joints. No lower extremity edema.   Skin: Warm and dry. Normal skin color.   Neuro: Speech clear. CNs grossly intact. Moves all extremities appropriately. Strength and sensation grossly intact to all extremities.   Psych: Tearful and anxious affect/mood, cooperative, memory appropriate.       Comprehensive Outline of History per [facility]    PAST MEDICAL HISTORY:  Past Medical History:   Diagnosis Date     Abdominal pain  5/22/2015     Acute pancreatitis 05/22/2015     ADHD (attention deficit hyperactivity disorder)      Alcohol abuse      Anxiety      Asthma      Bronchitis      Cancer (H)      Chronic pancreatitis (H)      Depression      Fibromyalgia      GERD (gastroesophageal reflux disease)      Hypokalemia      Migraines      Migraines      Mood disorder (H)      Pancreatitis 05/22/2015     Periodontitis      Pneumonia      Seizures (H)      UTI (lower urinary tract infection)        PAST SURGICAL HISTORY:  Past Surgical History:   Procedure Laterality Date     CERVICAL CONE BIOPSY       DENTAL SURGERY      wisdom teeth removed       CURRENT MEDICATIONS:    Current Facility-Administered Medications on File Prior to Encounter   Medication     [COMPLETED] potassium chloride ER tablet 30 mEq (K-DUR,KLOR-CON)     [DISCONTINUED] acetaminophen tablet 1,000 mg (TYLENOL)     [DISCONTINUED] albuterol inhaler 2 puff (PROAIR HFA;PROVENTIL HFA;VENTOLIN HFA)     [DISCONTINUED] albuterol nebulizer solution 2.5 mg (PROVENTIL)     [DISCONTINUED] amLODIPine tablet 2.5 mg (NORVASC)     [DISCONTINUED] bisacodyL suppository 10 mg (DULCOLAX)     [DISCONTINUED] budesonide-formoteroL 160-4.5 mcg/actuation inhaler 2 puff (SYMBICORT)     [DISCONTINUED] cyclobenzaprine tablet 5 mg (FLEXERIL)     [DISCONTINUED] dextroamphetamine-amphetamine 10 mg tablet 2 tablet (ADDERALL)     [DISCONTINUED] dextrose 10%     [DISCONTINUED] enoxaparin ANTICOAGULANT syringe 40 mg (LOVENOX)     [DISCONTINUED] FLUoxetine capsule 40 mg (PROzac)     [DISCONTINUED] gabapentin capsule 600 mg (NEURONTIN)     [DISCONTINUED] gabapentin capsule 900 mg (NEURONTIN)     [DISCONTINUED] hydrALAZINE injection 5 mg (APRESOLINE)     [DISCONTINUED] hydrOXYzine pamoate capsule 25-50 mg (VISTARIL)     [DISCONTINUED] ketorolac injection 15 mg (TORADOL)     [DISCONTINUED] levETIRAcetam tablet 1,000 mg (KEPPRA)     [DISCONTINUED] lidocaine 4 % patch 1 patch     [DISCONTINUED]  lipase-protease-amylase 5,000-17,000- 24,000 unit capsule 2 capsule (ZENPEP)     [DISCONTINUED] LORazepam tablet 0.5 mg (ATIVAN)     [DISCONTINUED] magnesium hydroxide suspension 30 mL (MILK OF MAG)     [DISCONTINUED] montelukast tablet 10 mg (SINGULAIR)     [DISCONTINUED] multivitamin with minerals 9 mg iron-400 mcg tablet 1 tablet (THERA-M)     [DISCONTINUED] naloxone injection 0.2 mg (NARCAN)     [DISCONTINUED] naloxone injection 0.2 mg (NARCAN)     [DISCONTINUED] naloxone injection 0.4 mg (NARCAN)     [DISCONTINUED] naloxone injection 0.4 mg (NARCAN)     [DISCONTINUED] nicotine 14 mg/24 hr 1 patch (NICODERM CQ)     [DISCONTINUED] omeprazole capsule 20 mg (PriLOSEC)     [DISCONTINUED] ondansetron injection 4 mg (ZOFRAN)     [DISCONTINUED] ondansetron tablet 8 mg (ZOFRAN)     [DISCONTINUED] oxyCODONE-acetaminophen 5-325 mg 1 tablet (PERCOCET/ENDOCET)     [DISCONTINUED] polyethylene glycol packet 17 g (MIRALAX)     [DISCONTINUED] sodium bicarbonate tablet 325 mg     [DISCONTINUED] topiramate (TOPAMAX) tablet 150 mg     Current Outpatient Medications on File Prior to Encounter   Medication Sig     acetaminophen (TYLENOL) 325 MG tablet Take 650 mg by mouth every 6 (six) hours as needed for pain.     albuterol (PROVENTIL HFA;VENTOLIN HFA) 90 mcg/actuation inhaler Inhale 2 puffs every 6 (six) hours as needed for wheezing.     albuterol (PROVENTIL) 2.5 mg /3 mL (0.083 %) nebulizer solution Take 2.5 mg by nebulization every 6 (six) hours as needed for wheezing.     cyclobenzaprine (FLEXERIL) 5 MG tablet Take 5 mg by mouth daily.     dextroamphetamine-amphetamine (AMPHETAMINE-DEXTROAMPHETAMINE) 20 mg Tab Take 20 mg by mouth 2 times daily before breakfast and lunch.     FLUoxetine (PROZAC) 20 MG capsule Take 40 mg by mouth at bedtime. (2 tabs)     fluticasone propion-salmeteroL (ADVAIR HFA) 230-21 mcg/actuation inhaler Inhale 2 puffs 2 (two) times a day.     gabapentin (NEURONTIN) 300 MG capsule Take 600 mg by mouth daily  after supper.      gabapentin (NEURONTIN) 300 MG capsule Take 900 mg by mouth every evening.     hydrOXYzine (VISTARIL) 25 MG capsule Take 25-50 mg by mouth every 4 (four) hours as needed for itching or anxiety.      levETIRAcetam (KEPPRA) 1000 MG tablet Take 1,000 mg by mouth 2 (two) times a day.     lidocaine 4 % patch Remove and discard patch with 12 hours or as directed by MD. (Patient taking differently: Place 1 patch on the skin daily as needed for pain (to back, neck, shoulder). Remove and discard patch with 12 hours or as directed by MD.)     montelukast (SINGULAIR) 10 mg tablet Take 10 mg by mouth at bedtime.     omeprazole (PRILOSEC) 20 MG capsule Take 20 mg by mouth daily.      ondansetron (ZOFRAN) 8 MG tablet Take 4 mg by mouth every 12 (twelve) hours as needed for nausea.      potassium chloride (K-DUR,KLOR-CON) 20 MEQ tablet Take 40 mEq by mouth daily. X 3 days     topiramate (TOPAMAX) 100 MG tablet Take 150 mg by mouth at bedtime.      [DISCONTINUED] LORazepam (ATIVAN) 0.5 MG tablet Take 1 tablet (0.5 mg total) by mouth 3 (three) times a day as needed for anxiety.     [DISCONTINUED] oxyCODONE-acetaminophen (PERCOCET/ENDOCET) 5-325 mg per tablet Take 1 tablet by mouth every 4 (four) hours as needed.       ALLERGIES:  Allergies   Allergen Reactions     Doxycycline Hives     Amoxicillin Hives     Penicillins Hives and Rash     All over body as a child     Vicodin [Hydrocodone-Acetaminophen] Rash     Only in 1 type of Vicodin. It was the filler. Has tolerated other products       FAMILY HISTORY:  Family History   Problem Relation Age of Onset     Arthritis Mother         Psoriatic arthritis     Diabetes Mother      Depression Mother      Anxiety disorder Mother      COPD Mother      ALS Father      Depression Father      Asthma Brother      ADD / ADHD Brother      Arthritis Maternal Grandmother         Rheumatoid Arthritis     Lupus Paternal Grandfather        SOCIAL HISTORY:   Social History      Socioeconomic History     Marital status: Single     Spouse name: Not on file     Number of children: Not on file     Years of education: Not on file     Highest education level: Not on file   Occupational History     Not on file   Social Needs     Financial resource strain: Not on file     Food insecurity     Worry: Not on file     Inability: Not on file     Transportation needs     Medical: Not on file     Non-medical: Not on file   Tobacco Use     Smoking status: Current Every Day Smoker     Packs/day: 0.50     Years: 17.00     Pack years: 8.50     Types: Cigarettes     Smokeless tobacco: Never Used     Tobacco comment: Trying to cut back on smoking cigarettes   Substance and Sexual Activity     Alcohol use: No     Comment: last drink was 1 year ago     Drug use: No     Sexual activity: Not on file   Lifestyle     Physical activity     Days per week: Not on file     Minutes per session: Not on file     Stress: Not on file   Relationships     Social connections     Talks on phone: Not on file     Gets together: Not on file     Attends Zoroastrian service: Not on file     Active member of club or organization: Not on file     Attends meetings of clubs or organizations: Not on file     Relationship status: Not on file     Intimate partner violence     Fear of current or ex partner: Not on file     Emotionally abused: Not on file     Physically abused: Not on file     Forced sexual activity: Not on file   Other Topics Concern     Not on file   Social History Narrative    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 Magdalena Watkins based on my observation and the provider's statements to me. IMagdalena MD attest that Aaron Strange is acting in a scribe capacity, has observed my performance of the services and has documented them in accordance with my direction.    Magdalena Watkins M.D.  Emergency Medicine  [facility]  [facility] EMERGENCY DEPARTMENT  [address]  [facility] [address]  Dept: [phone]  Loc: [phone]        Magdalena Watkins MD  05/31/21 2346

## 2021-06-29 ENCOUNTER — COMMUNICATION - HEALTHEAST (OUTPATIENT)
Dept: RESPIRATORY THERAPY | Facility: HOSPITAL | Age: 41
End: 2021-06-29

## 2021-07-04 NOTE — ANESTHESIA PREPROCEDURE EVALUATION
Anesthesia Preprocedure Evaluation by Joey Ramos MD at 6/11/2021  8:58 AM     Author: Joey Ramos MD Service: -- Author Type: Physician    Filed: 6/11/2021  8:58 AM Date of Service: 6/11/2021  8:58 AM Status: Signed    : Joey Ramos MD (Physician)       Anesthesia Evaluation      Patient summary reviewed   No history of anesthetic complications     Airway   Mallampati: III  Neck ROM: full   Pulmonary - normal exam   (+) asthma  a smoker  (-) pneumonia, COPD                         Cardiovascular - negative ROS and normal exam   Neuro/Psych    (+) seizures, neuromuscular disease,  depression, anxiety/panic attacks, chronic pain    Endo/Other - negative ROS      GI/Hepatic/Renal    (+) GERD,   impaired hepatic function (alcohol abuse)          Dental                               Anesthesia Plan  Planned anesthetic: general endotracheal  50 mg ketamine IV on induction.    ASA 2   Induction: intravenous   Anesthetic plan and risks discussed with: patient  Anesthesia plan special considerations: antiemetics,   Post-op plan: routine recovery

## 2021-07-04 NOTE — TELEPHONE ENCOUNTER
Telephone Encounter by Raquel Lira LRT at 6/29/2021  4:47 PM     Author: Raquel Lira LRT Service: -- Author Type: Respiratory Therapist    Filed: 6/29/2021  4:50 PM Encounter Date: 6/29/2021 Status: Signed    : Raquel Lira LRT (Respiratory Therapist)       Tobacco Treatment Follow Up Call     Left message for Janel that included my contact information should she have any questions or want any support or resource information.     ABIODUN Caldera, Chronic Pulmonary Disease Specialist & Tobacco Treatment Specialist  Phone [phone]

## 2021-07-04 NOTE — ANESTHESIA PREPROCEDURE EVALUATION
Anesthesia Preprocedure Evaluation by Joey Ramos MD at 6/6/2021 12:02 PM     Author: Joey Ramos MD Service: -- Author Type: Physician    Filed: 6/6/2021 12:03 PM Date of Service: 6/6/2021 12:02 PM Status: Signed    : Joey Ramos MD (Physician)       Anesthesia Evaluation      Patient summary reviewed   No history of anesthetic complications     Airway   Mallampati: III  Neck ROM: full   Pulmonary - normal exam   (+) asthma  mild, a smoker  (-) pneumonia, COPD                         Cardiovascular - negative ROS and normal exam   Neuro/Psych    (+) seizures, neuromuscular disease,  depression, anxiety/panic attacks, chronic pain    Endo/Other - negative ROS      GI/Hepatic/Renal    (+) GERD,   impaired hepatic function (alcohol abuse)          Dental                             Anesthesia Plan  Planned anesthetic: general endotracheal  50 mg ketamine IV on induction.    ASA 2   Induction: intravenous   Anesthetic plan and risks discussed with: patient and spouse  Anesthesia plan special considerations: antiemetics,   Post-op plan: routine recovery

## 2021-07-06 VITALS
BODY MASS INDEX: 23.67 KG/M2 | HEIGHT: 67 IN | BODY MASS INDEX: 23.21 KG/M2 | WEIGHT: 147.1 LBS | WEIGHT: 141.7 LBS | HEIGHT: 67 IN | WEIGHT: 150 LBS | BODY MASS INDEX: 23.67 KG/M2 | BODY MASS INDEX: 22.36 KG/M2 | BODY MASS INDEX: 22.36 KG/M2

## 2021-07-06 VITALS — WEIGHT: 150 LBS | BODY MASS INDEX: 23.49 KG/M2

## 2021-07-06 VITALS
BODY MASS INDEX: 22.46 KG/M2 | HEIGHT: 67 IN | HEIGHT: 67 IN | WEIGHT: 150 LBS | BODY MASS INDEX: 23.49 KG/M2 | BODY MASS INDEX: 22.46 KG/M2 | WEIGHT: 143.4 LBS | BODY MASS INDEX: 23.49 KG/M2

## 2021-07-08 ENCOUNTER — RECORDS - HEALTHEAST (OUTPATIENT)
Dept: LAB | Facility: CLINIC | Age: 41
End: 2021-07-08

## 2021-07-08 LAB
SARS-COV-2 PCR COMMENT: NORMAL
SARS-COV-2 RNA SPEC QL NAA+PROBE: NEGATIVE
SARS-COV-2 VIRUS SPECIMEN SOURCE: NORMAL

## 2021-07-09 ENCOUNTER — SURGERY - HEALTHEAST (OUTPATIENT)
Dept: SURGERY | Facility: HOSPITAL | Age: 41
End: 2021-07-09

## 2021-07-10 ENCOUNTER — HEALTH MAINTENANCE LETTER (OUTPATIENT)
Age: 41
End: 2021-07-10

## 2021-07-21 DIAGNOSIS — Z11.59 ENCOUNTER FOR SCREENING FOR OTHER VIRAL DISEASES: ICD-10-CM

## 2021-07-28 ENCOUNTER — TELEPHONE (OUTPATIENT)
Dept: RESPIRATORY THERAPY | Facility: HOSPITAL | Age: 41
End: 2021-07-28

## 2021-07-28 NOTE — TELEPHONE ENCOUNTER
Smoking Cessation Counseling Phone Call    Left message along with my contact information should Denisse have any questions or wish to speak to me regarding smoking cessation.    Raquel Lira, RT, TTS, Chronic Pulmonary Disease Specialist'

## 2021-08-20 ENCOUNTER — LAB REQUISITION (OUTPATIENT)
Dept: LAB | Facility: CLINIC | Age: 41
End: 2021-08-20
Payer: COMMERCIAL

## 2021-08-20 DIAGNOSIS — Z01.818 ENCOUNTER FOR OTHER PREPROCEDURAL EXAMINATION: ICD-10-CM

## 2021-08-20 LAB
ALBUMIN SERPL-MCNC: 4.3 G/DL (ref 3.5–5)
ALP SERPL-CCNC: 74 U/L (ref 45–120)
ALT SERPL W P-5'-P-CCNC: 21 U/L (ref 0–45)
ANION GAP SERPL CALCULATED.3IONS-SCNC: 12 MMOL/L (ref 5–18)
AST SERPL W P-5'-P-CCNC: 20 U/L (ref 0–40)
BILIRUB SERPL-MCNC: 0.2 MG/DL (ref 0–1)
BUN SERPL-MCNC: 12 MG/DL (ref 8–22)
CALCIUM SERPL-MCNC: 9.6 MG/DL (ref 8.5–10.5)
CHLORIDE BLD-SCNC: 107 MMOL/L (ref 98–107)
CO2 SERPL-SCNC: 20 MMOL/L (ref 22–31)
CREAT SERPL-MCNC: 0.86 MG/DL (ref 0.6–1.1)
GFR SERPL CREATININE-BSD FRML MDRD: 85 ML/MIN/1.73M2
GLUCOSE BLD-MCNC: 102 MG/DL (ref 70–125)
LIPASE SERPL-CCNC: 17 U/L (ref 0–52)
POTASSIUM BLD-SCNC: 3.8 MMOL/L (ref 3.5–5)
PROT SERPL-MCNC: 7.3 G/DL (ref 6–8)
SODIUM SERPL-SCNC: 139 MMOL/L (ref 136–145)

## 2021-08-20 PROCEDURE — 36415 COLL VENOUS BLD VENIPUNCTURE: CPT | Mod: ORL | Performed by: PHYSICIAN ASSISTANT

## 2021-08-20 PROCEDURE — 83690 ASSAY OF LIPASE: CPT | Mod: ORL | Performed by: PHYSICIAN ASSISTANT

## 2021-08-20 PROCEDURE — 80053 COMPREHEN METABOLIC PANEL: CPT | Mod: ORL | Performed by: PHYSICIAN ASSISTANT

## 2021-08-24 ENCOUNTER — TELEPHONE (OUTPATIENT)
Dept: RESPIRATORY THERAPY | Facility: HOSPITAL | Age: 41
End: 2021-08-24

## 2021-08-24 NOTE — TELEPHONE ENCOUNTER
Smoking Cessation Counseling Phone Call    Left message along with my contact information should she have any questions or wish to speak to me regarding smoking cessation.    Raquel Lira, RT, TTS, Chronic Pulmonary Disease Specialist'

## 2021-09-01 ENCOUNTER — LAB (OUTPATIENT)
Dept: LAB | Facility: CLINIC | Age: 41
End: 2021-09-01
Attending: INTERNAL MEDICINE
Payer: COMMERCIAL

## 2021-09-01 DIAGNOSIS — Z11.59 ENCOUNTER FOR SCREENING FOR OTHER VIRAL DISEASES: ICD-10-CM

## 2021-09-01 PROCEDURE — U0005 INFEC AGEN DETEC AMPLI PROBE: HCPCS

## 2021-09-01 PROCEDURE — U0003 INFECTIOUS AGENT DETECTION BY NUCLEIC ACID (DNA OR RNA); SEVERE ACUTE RESPIRATORY SYNDROME CORONAVIRUS 2 (SARS-COV-2) (CORONAVIRUS DISEASE [COVID-19]), AMPLIFIED PROBE TECHNIQUE, MAKING USE OF HIGH THROUGHPUT TECHNOLOGIES AS DESCRIBED BY CMS-2020-01-R: HCPCS

## 2021-09-02 LAB — SARS-COV-2 RNA RESP QL NAA+PROBE: NEGATIVE

## 2021-09-03 ENCOUNTER — ANESTHESIA EVENT (OUTPATIENT)
Dept: SURGERY | Facility: HOSPITAL | Age: 41
End: 2021-09-03
Payer: COMMERCIAL

## 2021-09-03 ENCOUNTER — HOSPITAL ENCOUNTER (OUTPATIENT)
Facility: HOSPITAL | Age: 41
Discharge: HOME | End: 2021-09-03
Attending: INTERNAL MEDICINE | Admitting: INTERNAL MEDICINE
Payer: COMMERCIAL

## 2021-09-03 ENCOUNTER — APPOINTMENT (OUTPATIENT)
Dept: RADIOLOGY | Facility: HOSPITAL | Age: 41
End: 2021-09-03
Attending: INTERNAL MEDICINE
Payer: COMMERCIAL

## 2021-09-03 ENCOUNTER — ANESTHESIA (OUTPATIENT)
Dept: SURGERY | Facility: HOSPITAL | Age: 41
End: 2021-09-03
Payer: COMMERCIAL

## 2021-09-03 VITALS
BODY MASS INDEX: 22.36 KG/M2 | WEIGHT: 141.7 LBS | RESPIRATION RATE: 20 BRPM | TEMPERATURE: 98.7 F | HEART RATE: 65 BPM | SYSTOLIC BLOOD PRESSURE: 110 MMHG | OXYGEN SATURATION: 99 % | DIASTOLIC BLOOD PRESSURE: 75 MMHG

## 2021-09-03 LAB — ERCP: NORMAL

## 2021-09-03 PROCEDURE — 360N000082 HC SURGERY LEVEL 2 W/ FLUORO, PER MIN: Performed by: INTERNAL MEDICINE

## 2021-09-03 PROCEDURE — 250N000011 HC RX IP 250 OP 636: Performed by: ANESTHESIOLOGY

## 2021-09-03 PROCEDURE — 74330 X-RAY BILE/PANC ENDOSCOPY: CPT

## 2021-09-03 PROCEDURE — 710N000012 HC RECOVERY PHASE 2, PER MINUTE: Performed by: INTERNAL MEDICINE

## 2021-09-03 PROCEDURE — C1726 CATH, BAL DIL, NON-VASCULAR: HCPCS | Performed by: INTERNAL MEDICINE

## 2021-09-03 PROCEDURE — 250N000011 HC RX IP 250 OP 636: Performed by: NURSE ANESTHETIST, CERTIFIED REGISTERED

## 2021-09-03 PROCEDURE — C1769 GUIDE WIRE: HCPCS | Performed by: INTERNAL MEDICINE

## 2021-09-03 PROCEDURE — 272N000001 HC OR GENERAL SUPPLY STERILE: Performed by: INTERNAL MEDICINE

## 2021-09-03 PROCEDURE — 999N000141 HC STATISTIC PRE-PROCEDURE NURSING ASSESSMENT: Performed by: INTERNAL MEDICINE

## 2021-09-03 PROCEDURE — 88305 TISSUE EXAM BY PATHOLOGIST: CPT | Mod: TC | Performed by: INTERNAL MEDICINE

## 2021-09-03 PROCEDURE — 370N000017 HC ANESTHESIA TECHNICAL FEE, PER MIN: Performed by: INTERNAL MEDICINE

## 2021-09-03 PROCEDURE — 250N000009 HC RX 250: Performed by: NURSE ANESTHETIST, CERTIFIED REGISTERED

## 2021-09-03 RX ORDER — NALOXONE HYDROCHLORIDE 0.4 MG/ML
0.4 INJECTION, SOLUTION INTRAMUSCULAR; INTRAVENOUS; SUBCUTANEOUS
Status: DISCONTINUED | OUTPATIENT
Start: 2021-09-03 | End: 2021-09-03 | Stop reason: HOSPADM

## 2021-09-03 RX ORDER — PROPOFOL 10 MG/ML
INJECTION, EMULSION INTRAVENOUS PRN
Status: DISCONTINUED | OUTPATIENT
Start: 2021-09-03 | End: 2021-09-03

## 2021-09-03 RX ORDER — SODIUM CHLORIDE, SODIUM LACTATE, POTASSIUM CHLORIDE, CALCIUM CHLORIDE 600; 310; 30; 20 MG/100ML; MG/100ML; MG/100ML; MG/100ML
INJECTION, SOLUTION INTRAVENOUS CONTINUOUS
Status: DISCONTINUED | OUTPATIENT
Start: 2021-09-03 | End: 2021-09-03 | Stop reason: HOSPADM

## 2021-09-03 RX ORDER — LIDOCAINE HYDROCHLORIDE 20 MG/ML
INJECTION, SOLUTION INFILTRATION; PERINEURAL PRN
Status: DISCONTINUED | OUTPATIENT
Start: 2021-09-03 | End: 2021-09-03

## 2021-09-03 RX ORDER — PROPOFOL 10 MG/ML
INJECTION, EMULSION INTRAVENOUS CONTINUOUS PRN
Status: DISCONTINUED | OUTPATIENT
Start: 2021-09-03 | End: 2021-09-03

## 2021-09-03 RX ORDER — FENTANYL CITRATE 50 UG/ML
25 INJECTION, SOLUTION INTRAMUSCULAR; INTRAVENOUS
Status: DISCONTINUED | OUTPATIENT
Start: 2021-09-03 | End: 2021-09-03 | Stop reason: HOSPADM

## 2021-09-03 RX ORDER — MEPERIDINE HYDROCHLORIDE 25 MG/ML
12.5 INJECTION INTRAMUSCULAR; INTRAVENOUS; SUBCUTANEOUS
Status: DISCONTINUED | OUTPATIENT
Start: 2021-09-03 | End: 2021-09-03 | Stop reason: HOSPADM

## 2021-09-03 RX ORDER — HALOPERIDOL 5 MG/ML
1 INJECTION INTRAMUSCULAR
Status: DISCONTINUED | OUTPATIENT
Start: 2021-09-03 | End: 2021-09-03 | Stop reason: HOSPADM

## 2021-09-03 RX ORDER — NALOXONE HYDROCHLORIDE 0.4 MG/ML
0.2 INJECTION, SOLUTION INTRAMUSCULAR; INTRAVENOUS; SUBCUTANEOUS
Status: DISCONTINUED | OUTPATIENT
Start: 2021-09-03 | End: 2021-09-03 | Stop reason: HOSPADM

## 2021-09-03 RX ORDER — FENTANYL CITRATE 50 UG/ML
25 INJECTION, SOLUTION INTRAMUSCULAR; INTRAVENOUS EVERY 5 MIN PRN
Status: DISCONTINUED | OUTPATIENT
Start: 2021-09-03 | End: 2021-09-03 | Stop reason: HOSPADM

## 2021-09-03 RX ORDER — ACETAMINOPHEN 500 MG
500-1000 TABLET ORAL EVERY 6 HOURS PRN
COMMUNITY

## 2021-09-03 RX ORDER — ONDANSETRON 2 MG/ML
4 INJECTION INTRAMUSCULAR; INTRAVENOUS EVERY 6 HOURS PRN
Status: CANCELLED | OUTPATIENT
Start: 2021-09-03

## 2021-09-03 RX ORDER — FLUMAZENIL 0.1 MG/ML
0.2 INJECTION, SOLUTION INTRAVENOUS
Status: CANCELLED | OUTPATIENT
Start: 2021-09-03 | End: 2021-09-03

## 2021-09-03 RX ORDER — ONDANSETRON 4 MG/1
4 TABLET, ORALLY DISINTEGRATING ORAL EVERY 30 MIN PRN
Status: DISCONTINUED | OUTPATIENT
Start: 2021-09-03 | End: 2021-09-03 | Stop reason: HOSPADM

## 2021-09-03 RX ORDER — ONDANSETRON 4 MG/1
4 TABLET, ORALLY DISINTEGRATING ORAL EVERY 6 HOURS PRN
Status: CANCELLED | OUTPATIENT
Start: 2021-09-03

## 2021-09-03 RX ORDER — OXYCODONE HYDROCHLORIDE 5 MG/1
5 TABLET ORAL EVERY 4 HOURS PRN
Status: DISCONTINUED | OUTPATIENT
Start: 2021-09-03 | End: 2021-09-03 | Stop reason: HOSPADM

## 2021-09-03 RX ORDER — ONDANSETRON 2 MG/ML
4 INJECTION INTRAMUSCULAR; INTRAVENOUS EVERY 30 MIN PRN
Status: DISCONTINUED | OUTPATIENT
Start: 2021-09-03 | End: 2021-09-03 | Stop reason: HOSPADM

## 2021-09-03 RX ORDER — PROCHLORPERAZINE MALEATE 10 MG
10 TABLET ORAL EVERY 6 HOURS PRN
Status: CANCELLED | OUTPATIENT
Start: 2021-09-03

## 2021-09-03 RX ORDER — LIDOCAINE 40 MG/G
CREAM TOPICAL
Status: DISCONTINUED | OUTPATIENT
Start: 2021-09-03 | End: 2021-09-03 | Stop reason: HOSPADM

## 2021-09-03 RX ADMIN — LIDOCAINE HYDROCHLORIDE 60 MG: 20 INJECTION, SOLUTION INFILTRATION; PERINEURAL at 08:49

## 2021-09-03 RX ADMIN — ONDANSETRON 4 MG: 2 INJECTION INTRAMUSCULAR; INTRAVENOUS at 09:41

## 2021-09-03 RX ADMIN — PROPOFOL 50 MG: 10 INJECTION, EMULSION INTRAVENOUS at 08:49

## 2021-09-03 RX ADMIN — PROPOFOL 50 MG: 10 INJECTION, EMULSION INTRAVENOUS at 08:54

## 2021-09-03 RX ADMIN — PROPOFOL 140 MCG/KG/MIN: 10 INJECTION, EMULSION INTRAVENOUS at 08:49

## 2021-09-03 ASSESSMENT — LIFESTYLE VARIABLES: TOBACCO_USE: 1

## 2021-09-03 NOTE — H&P
GENERAL PRE-PROCEDURE:   Procedure:  ERCP - Bile Duct Stricture  Date/Time:  9/3/2021 7:12 AM    Verbal consent obtained?: Yes    Written consent obtained?: Yes    Risks and benefits: Risks, benefits and alternatives were discussed    Consent given by:  Patient  Patient states understanding of procedure being performed: Yes    Patient's understanding of procedure matches consent: Yes    Procedure consent matches procedure scheduled: Yes    Expected level of sedation:  Deep  Appropriately NPO:  Yes  ASA Class:  3  Mallampati  :  Grade 2- soft palate, base of uvula, tonsillar pillars, and portion of posterior pharyngeal wall visible  Lungs:  Lungs clear with good breath sounds bilaterally  Heart:  Normal heart sounds and rate  History & Physical reviewed:  History and physical reviewed and no updates needed  Statement of review:  I have reviewed the lab findings, diagnostic data, medications, and the plan for sedation

## 2021-09-03 NOTE — ANESTHESIA POSTPROCEDURE EVALUATION
Patient: Janel Calzada    Procedure(s):  ENDOSCOPIC RETROGRADE CHOLANGIOPANCREATOGRAPHY WITH STENT REMOVAL AND BILE DUCT STRICTURE BRUSHING    Diagnosis:Bile duct stricture [K83.1]  Encounter for removal of biliary stent [Z46.89]  Diagnosis Additional Information: No value filed.    Anesthesia Type:  MAC    Note:  Disposition: Outpatient   Postop Pain Control: Uneventful            Sign Out: Well controlled pain   PONV: No   Neuro/Psych: Uneventful            Sign Out: Acceptable/Baseline neuro status   Airway/Respiratory: Uneventful            Sign Out: Acceptable/Baseline resp. status   CV/Hemodynamics: Uneventful            Sign Out: Acceptable CV status; No obvious hypovolemia; No obvious fluid overload   Other NRE: NONE   DID A NON-ROUTINE EVENT OCCUR?            Last vitals:  Vitals Value Taken Time   /75 09/03/21 1000   Temp 37.1  C (98.7  F) 09/03/21 0916   Pulse 65 09/03/21 1000   Resp 20 09/03/21 1000   SpO2 99 % 09/03/21 1000       Electronically Signed By: Joey Ramos MD  September 3, 2021  10:41 AM

## 2021-09-03 NOTE — ANESTHESIA PREPROCEDURE EVALUATION
Anesthesia Pre-Procedure Evaluation    Patient: Janel Calzada   MRN: [id number] : 1980        Preoperative Diagnosis: Bile duct stricture [K83.1]  Encounter for removal of biliary stent [Z46.89]   Procedure : Procedure(s):  ENDOSCOPIC RETROGRADE CHOLANGIOPANCREATOGRAPHY     Past Medical History:   Diagnosis Date     Abdominal pain 2015     Abdominal pain 2015     Acute pancreatitis 2015     Acute pancreatitis 2015     ADHD      ADHD (attention deficit hyperactivity disorder)      ADHD (attention deficit hyperactivity disorder)      Alcohol abuse      Alcohol abuse      Allergic state      Anxiety      Anxiety      Anxiety      Asthma      Asthma      Bronchitis      Bronchitis      Cancer (H)      Cancer (H)     cervical     Cancer (H)     cervical     Chronic pancreatitis (H)      Chronic pancreatitis (H)      Depression      Depression      Depressive disorder      Fibromyalgia      Fibromyalgia      GERD (gastroesophageal reflux disease)      GERD (gastroesophageal reflux disease)      Hypokalemia      Hypokalemia      Migraines      Migraines      Migraines      Migraines      Mood disorder (H)      Mood disorder (H)      Ovarian cyst      Ovarian cyst      Pancreatitis 2015     Pancreatitis 2015     Periodontitis      Periodontitis      Pneumonia      Pneumonia      PONV (postoperative nausea and vomiting)      Seizures (H)      Seizures (H)      UTI (lower urinary tract infection)      UTI (lower urinary tract infection)       Past Surgical History:   Procedure Laterality Date     CONIZATION       DENTAL SURGERY      wisdom teeth removed     GYN SURGERY       HYSTERECTOMY       HYSTERECTOMY       LAPAROSCOPIC CHOLECYSTECTOMY N/A 2021    Procedure: CHOLECYSTECTOMY, LAPAROSCOPIC;  Surgeon: Nicolette Sanchez MD;  [place] [facility] [place]: [place]     [place] EDG US EXAM SURGICAL ALTER STOM DUODENUM/JEJUNUM N/A 2021    Procedure: ENDOSCOPIC  ULTRASOUND, UPPER TRACT;  Surgeon: Pedro Humphries MD;  Location: [facility] [place];  Service: Gastroenterology     TN ERCP DX COLLECTION SPECIMEN BRUSHING/WASHING N/A 6/11/2021    Procedure: ENDOSCOPIC RETROGRADE CHOLANGIOPANCREATOGRAPHY,SPHINTEROTOMY ,BRUSHINGS AND BILARY STENT PLACEMENT;  Surgeon: Socrates Campbell MD;  Location: [facility] OR;  Service: Gastroenterology     XR ERCP BILIARY ONLY  6/11/2021      Allergies   Allergen Reactions     Amoxicillin      Doxycycline      Penicillins      Hydrocodone-Acetaminophen Rash     Other reaction(s): Hiives/itching  Only in 1 type of Vicodin. It was the filler. Has tolerated other products  Other reaction(s): Hiives/itching        Social History     Tobacco Use     Smoking status: Current Every Day Smoker     Packs/day: 0.50     Years: 17.00     Pack years: 8.50     Types: Cigarettes     Smokeless tobacco: Never Used     Tobacco comment: Trying to cut back on smoking cigarettes   Substance Use Topics     Alcohol use: Not Currently     Comment: Alcoholic Drinks/day: last drink was 1 year ago      Wt Readings from Last 1 Encounters:   09/03/21 64.3 kg (141 lb 11.2 oz)        Anesthesia Evaluation   Pt has had prior anesthetic.     No history of anesthetic complications       ROS/MED HX  ENT/Pulmonary:     (+) tobacco use, Intermittent, asthma recent URI,     Neurologic:       Cardiovascular:  - neg cardiovascular ROS     METS/Exercise Tolerance: >4 METS    Hematologic:       Musculoskeletal:       GI/Hepatic:     (+) GERD, Asymptomatic on medication,     Renal/Genitourinary:  - neg Renal ROS     Endo:  - neg endo ROS     Psychiatric/Substance Use:     (+) psychiatric history anxiety, other (comment) and depression     Infectious Disease:  - neg infectious disease ROS     Malignancy:  - neg malignancy ROS     Other:      (+) , H/O Chronic Pain,        Physical Exam    Airway  airway exam normal      Mallampati: II   TM distance: > 3 FB   Neck ROM: full   Mouth  opening: > 3 cm    Respiratory Devices and Support         Dental       (+) upper dentures      Cardiovascular   cardiovascular exam normal          Pulmonary   pulmonary exam normal                OUTSIDE LABS:  CBC:   Lab Results   Component Value Date    WBC 9.3 06/10/2021    WBC 6.8 06/06/2021    HGB 12.9 06/10/2021    HGB 11.3 (L) 06/06/2021    HCT 37.8 06/10/2021    HCT 33.1 (L) 06/06/2021     06/10/2021     06/06/2021     BMP:   Lab Results   Component Value Date     08/20/2021     06/12/2021    POTASSIUM 3.8 08/20/2021    POTASSIUM 4.2 06/12/2021    CHLORIDE 107 08/20/2021    CHLORIDE 112 (H) 06/12/2021    CO2 20 (L) 08/20/2021    CO2 21 (L) 06/12/2021    BUN 12 08/20/2021    BUN 6 (L) 06/12/2021    CR 0.86 08/20/2021    CR 0.61 06/12/2021     08/20/2021     06/12/2021     COAGS: No results found for: PTT, INR, FIBR  POC:   Lab Results   Component Value Date    HCG Negative 06/11/2021    HCGS Negative 07/25/2018     HEPATIC:   Lab Results   Component Value Date    ALBUMIN 4.3 08/20/2021    PROTTOTAL 7.3 08/20/2021    ALT 21 08/20/2021    AST 20 08/20/2021    ALKPHOS 74 08/20/2021    BILITOTAL 0.2 08/20/2021     OTHER:   Lab Results   Component Value Date    LACT 0.8 06/04/2021    BROOK 9.6 08/20/2021    PHOS 5.3 (H) 05/31/2021    MAG 2.0 06/11/2021    LIPASE 17 08/20/2021       Anesthesia Plan    ASA Status:  2   NPO Status:  NPO Appropriate    Anesthesia Type: MAC.     - Reason for MAC: straight local not clinically adequate   Induction: Propofol.   Maintenance: TIVA.        Consents    Anesthesia Plan(s) and associated risks, benefits, and realistic alternatives discussed. Questions answered and patient/representative(s) expressed understanding.     - Discussed with:  Patient, Other (See Comment)      - Extended Intubation/Ventilatory Support Discussed: No.      - Patient is DNR/DNI Status: No    Use of blood products discussed: No .     Postoperative Care    Pain  management: IV analgesics, Multi-modal analgesia, Oral pain medications.   PONV prophylaxis: Ondansetron (or other 5HT-3)     Comments:                Joey Ramos MD

## 2021-09-03 NOTE — ANESTHESIA CARE TRANSFER NOTE
Patient: Janel Calzada    Procedure(s):  ENDOSCOPIC RETROGRADE CHOLANGIOPANCREATOGRAPHY WITH STENT REMOVAL AND BILE DUCT STRICTURE BRUSHING    Diagnosis: Bile duct stricture [K83.1]  Encounter for removal of biliary stent [Z46.89]  Diagnosis Additional Information: No value filed.    Anesthesia Type:   MAC     Note:    Oropharynx: oropharynx clear of all foreign objects  Level of Consciousness: awake  Oxygen Supplementation: room air    Independent Airway: airway patency satisfactory and stable  Dentition: dentition unchanged  Vital Signs Stable: post-procedure vital signs reviewed and stable  Report to RN Given: handoff report given  Patient transferred to: Phase II    Handoff Report: Identifed the Patient, Identified the Reponsible Provider, Reviewed the pertinent medical history, Discussed the surgical course, Reviewed Intra-OP anesthesia mangement and issues during anesthesia, Set expectations for post-procedure period and Allowed opportunity for questions and acknowledgement of understanding      Vitals:  Vitals Value Taken Time   /60 09/03/21 0916   Temp 37.1  C (98.7  F) 09/03/21 0916   Pulse 65 09/03/21 0916   Resp 20 09/03/21 0916   SpO2 99 % 09/03/21 0916       Electronically Signed By: IRMA ALCAZAR CRNA  September 3, 2021  9:19 AM

## 2021-09-03 NOTE — DISCHARGE INSTRUCTIONS
ERCP Discharge Instructions: Please call [facility] at [phone] for any questions or concerns.      In order to avoid complications, please take special note of the items listed below.      You may be drowsy for the next few hours.    DO NOT conduct important business, have any alcohol or drive a vehicle for the next 12 hours.    Do not have anything to eat until tomorrow.    Do not have anything to drink until 4 hours after your procedure.    4 hours after your procedure, you may drink clear liquids only.    You may resume your usual diet tomorrow.    You may resume your regular activity tomorrow.    If you experience a temperature above 101 degrees, please call your doctor.    If you have redness or swelling where the medications were given, place a warm, wet washcloth over the affected area for 20 minutes, 3 times a day until the redness subsides. If the symptoms continue for more than 2-3 days call your doctor.    You may experience a sore throat after medications wear off.    Do not be alarmed if you cough/vomit up bright red blood in small amounts. Contact your doctor immediately, if large amounts of bright red blood are produced.    Notify your doctor if you experience chest pain or shoulder pain after procedure.      [facility] has physicians on call 24 hours a day, 7 days per week.    Thank You for choosing [facility]!

## 2021-09-04 ENCOUNTER — HEALTH MAINTENANCE LETTER (OUTPATIENT)
Age: 41
End: 2021-09-04

## 2021-09-07 LAB
PATH REPORT.COMMENTS IMP SPEC: ABNORMAL
PATH REPORT.COMMENTS IMP SPEC: YES
PATH REPORT.FINAL DX SPEC: ABNORMAL
PATH REPORT.GROSS SPEC: ABNORMAL
PATH REPORT.MICROSCOPIC SPEC OTHER STN: ABNORMAL

## 2021-09-07 PROCEDURE — 88305 TISSUE EXAM BY PATHOLOGIST: CPT | Mod: 26 | Performed by: PATHOLOGY

## 2021-09-07 PROCEDURE — 88112 CYTOPATH CELL ENHANCE TECH: CPT | Mod: 26 | Performed by: PATHOLOGY

## 2022-01-12 VITALS — HEIGHT: 67 IN | WEIGHT: 143.4 LBS | BODY MASS INDEX: 22.51 KG/M2

## 2022-01-12 VITALS — WEIGHT: 147.1 LBS | BODY MASS INDEX: 23.09 KG/M2 | HEIGHT: 67 IN

## 2022-01-18 VITALS — WEIGHT: 139 LBS | BODY MASS INDEX: 21.93 KG/M2

## 2022-01-20 ENCOUNTER — LAB REQUISITION (OUTPATIENT)
Dept: LAB | Facility: CLINIC | Age: 42
End: 2022-01-20
Payer: COMMERCIAL

## 2022-01-20 DIAGNOSIS — Z03.818 ENCOUNTER FOR OBSERVATION FOR SUSPECTED EXPOSURE TO OTHER BIOLOGICAL AGENTS RULED OUT: ICD-10-CM

## 2022-01-20 PROCEDURE — U0005 INFEC AGEN DETEC AMPLI PROBE: HCPCS | Mod: ORL | Performed by: FAMILY MEDICINE

## 2022-01-21 LAB — SARS-COV-2 RNA RESP QL NAA+PROBE: NEGATIVE

## 2022-01-26 ENCOUNTER — LAB REQUISITION (OUTPATIENT)
Dept: LAB | Facility: CLINIC | Age: 42
End: 2022-01-26
Payer: COMMERCIAL

## 2022-01-26 DIAGNOSIS — Z03.818 ENCOUNTER FOR OBSERVATION FOR SUSPECTED EXPOSURE TO OTHER BIOLOGICAL AGENTS RULED OUT: ICD-10-CM

## 2022-01-26 PROCEDURE — U0005 INFEC AGEN DETEC AMPLI PROBE: HCPCS | Mod: ORL | Performed by: FAMILY MEDICINE

## 2022-01-28 LAB — SARS-COV-2 RNA RESP QL NAA+PROBE: NOT DETECTED

## 2022-08-06 ENCOUNTER — HEALTH MAINTENANCE LETTER (OUTPATIENT)
Age: 42
End: 2022-08-06

## 2022-10-16 ENCOUNTER — HEALTH MAINTENANCE LETTER (OUTPATIENT)
Age: 42
End: 2022-10-16

## 2022-11-04 ENCOUNTER — MEDICAL CORRESPONDENCE (OUTPATIENT)
Dept: HEALTH INFORMATION MANAGEMENT | Facility: CLINIC | Age: 42
End: 2022-11-04

## 2022-11-04 ENCOUNTER — LAB REQUISITION (OUTPATIENT)
Dept: LAB | Facility: CLINIC | Age: 42
End: 2022-11-04
Payer: COMMERCIAL

## 2022-11-04 ENCOUNTER — TRANSFERRED RECORDS (OUTPATIENT)
Dept: HEALTH INFORMATION MANAGEMENT | Facility: CLINIC | Age: 42
End: 2022-11-04

## 2022-11-04 DIAGNOSIS — M25.50 PAIN IN UNSPECIFIED JOINT: ICD-10-CM

## 2022-11-04 DIAGNOSIS — K86.0 ALCOHOL-INDUCED CHRONIC PANCREATITIS (H): ICD-10-CM

## 2022-11-04 LAB
ALBUMIN SERPL BCG-MCNC: 4.5 G/DL (ref 3.5–5.2)
ALP SERPL-CCNC: 66 U/L (ref 35–104)
ALT SERPL W P-5'-P-CCNC: 18 U/L (ref 10–35)
AMYLASE SERPL-CCNC: 48 U/L (ref 28–100)
ANION GAP SERPL CALCULATED.3IONS-SCNC: 11 MMOL/L (ref 7–15)
AST SERPL W P-5'-P-CCNC: 20 U/L (ref 10–35)
BILIRUB SERPL-MCNC: 0.2 MG/DL
BUN SERPL-MCNC: 12.6 MG/DL (ref 6–20)
CALCIUM SERPL-MCNC: 9.3 MG/DL (ref 8.6–10)
CHLORIDE SERPL-SCNC: 104 MMOL/L (ref 98–107)
CREAT SERPL-MCNC: 0.78 MG/DL (ref 0.51–0.95)
DEPRECATED HCO3 PLAS-SCNC: 21 MMOL/L (ref 22–29)
ERYTHROCYTE [SEDIMENTATION RATE] IN BLOOD BY WESTERGREN METHOD: 12 MM/HR (ref 0–20)
GFR SERPL CREATININE-BSD FRML MDRD: >90 ML/MIN/1.73M2
GLUCOSE SERPL-MCNC: 95 MG/DL (ref 70–99)
LIPASE SERPL-CCNC: 12 U/L (ref 13–60)
POTASSIUM SERPL-SCNC: 4.6 MMOL/L (ref 3.4–5.3)
PROT SERPL-MCNC: 6.6 G/DL (ref 6.4–8.3)
SODIUM SERPL-SCNC: 136 MMOL/L (ref 136–145)
TSH SERPL DL<=0.005 MIU/L-ACNC: 0.85 UIU/ML (ref 0.3–4.2)

## 2022-11-04 PROCEDURE — 87476 LYME DIS DNA AMP PROBE: CPT | Mod: ORL | Performed by: PHYSICIAN ASSISTANT

## 2022-11-04 PROCEDURE — 86431 RHEUMATOID FACTOR QUANT: CPT | Mod: ORL | Performed by: PHYSICIAN ASSISTANT

## 2022-11-04 PROCEDURE — 82306 VITAMIN D 25 HYDROXY: CPT | Mod: ORL | Performed by: PHYSICIAN ASSISTANT

## 2022-11-04 PROCEDURE — 82150 ASSAY OF AMYLASE: CPT | Mod: ORL | Performed by: PHYSICIAN ASSISTANT

## 2022-11-04 PROCEDURE — 86038 ANTINUCLEAR ANTIBODIES: CPT | Mod: ORL | Performed by: PHYSICIAN ASSISTANT

## 2022-11-04 PROCEDURE — 83690 ASSAY OF LIPASE: CPT | Mod: ORL | Performed by: PHYSICIAN ASSISTANT

## 2022-11-04 PROCEDURE — 84443 ASSAY THYROID STIM HORMONE: CPT | Mod: ORL | Performed by: PHYSICIAN ASSISTANT

## 2022-11-04 PROCEDURE — 85652 RBC SED RATE AUTOMATED: CPT | Mod: ORL | Performed by: PHYSICIAN ASSISTANT

## 2022-11-04 PROCEDURE — 80053 COMPREHEN METABOLIC PANEL: CPT | Mod: ORL | Performed by: PHYSICIAN ASSISTANT

## 2022-11-05 LAB — DEPRECATED CALCIDIOL+CALCIFEROL SERPL-MC: 38 UG/L (ref 20–75)

## 2022-11-07 LAB
ANA SER QL IF: NEGATIVE
RHEUMATOID FACT SER NEPH-ACNC: <6 IU/ML

## 2022-11-10 LAB — B BURGDOR DNA SPEC QL NAA+PROBE: NOT DETECTED

## 2023-06-01 LAB — PHQ9 SCORE: 16

## 2023-07-22 ENCOUNTER — TRANSFERRED RECORDS (OUTPATIENT)
Dept: HEALTH INFORMATION MANAGEMENT | Facility: CLINIC | Age: 43
End: 2023-07-22

## 2023-07-31 LAB — HBA1C MFR BLD: 6.4 % (ref 4.2–6.1)

## 2023-08-02 ENCOUNTER — MEDICAL CORRESPONDENCE (OUTPATIENT)
Dept: HEALTH INFORMATION MANAGEMENT | Facility: CLINIC | Age: 43
End: 2023-08-02

## 2023-08-02 ENCOUNTER — LAB REQUISITION (OUTPATIENT)
Dept: LAB | Facility: CLINIC | Age: 43
End: 2023-08-02
Payer: COMMERCIAL

## 2023-08-02 ENCOUNTER — TRANSFERRED RECORDS (OUTPATIENT)
Dept: HEALTH INFORMATION MANAGEMENT | Facility: CLINIC | Age: 43
End: 2023-08-02

## 2023-08-02 DIAGNOSIS — K86.0 ALCOHOL-INDUCED CHRONIC PANCREATITIS (H): ICD-10-CM

## 2023-08-02 LAB
ALBUMIN SERPL BCG-MCNC: 4.1 G/DL (ref 3.5–5.2)
ALP SERPL-CCNC: 82 U/L (ref 35–104)
ALT SERPL W P-5'-P-CCNC: 24 U/L (ref 0–50)
AMYLASE SERPL-CCNC: 33 U/L (ref 28–100)
ANION GAP SERPL CALCULATED.3IONS-SCNC: 11 MMOL/L (ref 7–15)
AST SERPL W P-5'-P-CCNC: 23 U/L (ref 0–45)
BILIRUB SERPL-MCNC: <0.2 MG/DL
BUN SERPL-MCNC: 8.3 MG/DL (ref 6–20)
CALCIUM SERPL-MCNC: 9.7 MG/DL (ref 8.6–10)
CHLORIDE SERPL-SCNC: 104 MMOL/L (ref 98–107)
CREAT SERPL-MCNC: 0.82 MG/DL (ref 0.51–0.95)
DEPRECATED HCO3 PLAS-SCNC: 23 MMOL/L (ref 22–29)
GFR SERPL CREATININE-BSD FRML MDRD: >90 ML/MIN/1.73M2
GLUCOSE SERPL-MCNC: 116 MG/DL (ref 70–99)
LIPASE SERPL-CCNC: 13 U/L (ref 13–60)
POTASSIUM SERPL-SCNC: 4.3 MMOL/L (ref 3.4–5.3)
PROT SERPL-MCNC: 6.6 G/DL (ref 6.4–8.3)
SODIUM SERPL-SCNC: 138 MMOL/L (ref 136–145)

## 2023-08-02 PROCEDURE — 83690 ASSAY OF LIPASE: CPT | Mod: ORL | Performed by: PHYSICIAN ASSISTANT

## 2023-08-02 PROCEDURE — 82150 ASSAY OF AMYLASE: CPT | Mod: ORL | Performed by: PHYSICIAN ASSISTANT

## 2023-08-02 PROCEDURE — 80053 COMPREHEN METABOLIC PANEL: CPT | Mod: ORL | Performed by: PHYSICIAN ASSISTANT

## 2023-08-03 ENCOUNTER — TRANSCRIBE ORDERS (OUTPATIENT)
Dept: OTHER | Age: 43
End: 2023-08-03

## 2023-08-03 DIAGNOSIS — K86.0 ALCOHOL-INDUCED CHRONIC PANCREATITIS (H): Primary | ICD-10-CM

## 2023-08-08 ENCOUNTER — TELEPHONE (OUTPATIENT)
Dept: GASTROENTEROLOGY | Facility: CLINIC | Age: 43
End: 2023-08-08
Payer: COMMERCIAL

## 2023-08-08 ENCOUNTER — DOCUMENTATION ONLY (OUTPATIENT)
Dept: GASTROENTEROLOGY | Facility: CLINIC | Age: 43
End: 2023-08-08
Payer: COMMERCIAL

## 2023-08-08 NOTE — TELEPHONE ENCOUNTER
[facility]     Referring provider:     Dolores Mar PA-C       Referred to: [facility]     Provider Requested: na     Referral Received: 8/3/23     Records received: Media Tab/[facility]     Images received: none    Insurance Coverage:[facility]    Evaluation for: K86.0 (ICD-10-CM) - Alcohol-induced chronic pancreatitis (H)      Clinical History (per RN review):   Recent admission  Brief HPI Summary:    41 yo F with PMH fibromyalgia, seizure disorder, migraines, ETOH use, depression and EtOH pancreatitis with prior episode of gallstone pancreatitis and chronic biliary strictire that had previously required biliary stent that was subsequently removed in 2022. She presented on 7/19 with abdominal pain, found to have recurrent pancreatitis, now necrotizing. On day of admission pt developed pulm edema thought to be due to ARDS and required intubation. Transferred out of MICU 7/24.    Acute necrotizing EtOH pancreatitis, c/b ARDS (now improved)  Severe sepsis 2/2 necrotizing pancreatitis and pneumonia  Acute hypoxic respiratory failure 2/2 ARDS, multifocal PNA, CHF  Presented with elevated lipase to 371, abdominal pain radiating to back. CT imaging 7/20 demonstrated acute necrotizing pancreatitis (likely 2/2 increased EtOH intake prior to admission) as well as mulitfocal pneumonia. US abdomen 7/20 without bile duct dilatation. GI consulted. Unfortunately, developed rapidly escalating O2 requirements on 7/20 requiring intubation. Question of ARDS + PNA + component of pulmonary edema from new cardiomyopathy with depressed EF (as outlined below) given her rapid improvement. Extubated 7/22, with ongoing intermittent hypoxia likely due to volume overload, aspiration pneumonia, atelectasis 2/2 pain. Able to wean to room air. Complete 7 days of IV abx. Pain management was following while inpatient. Will discharge with 30 tabs of 4mg dilaudid, she has required opioids on discharge from prior  episodes and has weaned off of them. She was instructed to wean these as able herself after discharge. She should follow up with her PCP and her pain clinic re: pain management. She needs follow up with GI - PCP to place referral due to insurance issues.     CT Angio 7/20/23      HEPATOBILIARY: Hepatic steatosis. Cholecystectomy.     PANCREAS: Acute necrotizing pancreatitis centered in the body and tail of the pancreas with hypoenhancement of approximately 40% of the pancreatic body and tail. Extensive peripancreatic fat stranding. No organized fluid collection. Trace free fluid in the pelvis.     IMPRESSION:   1. Acute necrotizing pancreatitis. No organized fluid collection.     2.  Patchy consolidation in the bilateral lower lobes and dependent upper lobes suspicious for aspiration pneumonia.     3.  Small bilateral pleural effusions.         ERCP 9-22-22 after different admission for ETOH pancreatitis:    Impression:            - A single localized biliary stricture was found in                          the lower third of the main bile duct. The                          stricture was indeterminate.                          - The patient has had a cholecystectomy.                          - An irregularity was found in the entire opacified                          area of the pancreatic duct.                          - Cells for cytology obtained in the lower third of                          the main duct.                          - One covered metal stent was placed into the                          common bile duct.   Recommendation:        - Watch for pancreatitis, bleeding, perforation,                          and cholangitis.                          - Liquids today, advance in AM if no abdominal pain.                          - Return to GI clinic in 2 months, to discuss stent                          removal (probably in 6 months orso).     MD review date:   MD Decision for clinic consultation/Orders:             Referral updates/Patient contacted:

## 2023-08-08 NOTE — PROGRESS NOTES
Called [facility] to request images be pushed to [employer] PACS.    Images Requested:  -- CT Coronary ROR Calcium Scoring Rad Overread (07/27/2023 1:22 PM CDT)  -- CT Angio Coronary W Pradip Score Card (07/27/2023 1:22 PM CDT)  -- FL Video Swallow Study (07/25/2023 11:23 AM CDT)  -- CT Angio Chest And CT Abd Pelvis W IV Cont (07/20/2023 6:49 AM CDT)    Facility Information:  [facility] [address] [place] [address]   Phone #: [phone]      SK

## 2023-08-26 ENCOUNTER — HEALTH MAINTENANCE LETTER (OUTPATIENT)
Age: 43
End: 2023-08-26

## 2024-03-14 ENCOUNTER — LAB REQUISITION (OUTPATIENT)
Dept: LAB | Facility: CLINIC | Age: 44
End: 2024-03-14
Payer: COMMERCIAL

## 2024-03-14 DIAGNOSIS — G40.909 EPILEPSY, UNSPECIFIED, NOT INTRACTABLE, WITHOUT STATUS EPILEPTICUS (H): ICD-10-CM

## 2024-03-14 PROCEDURE — 80053 COMPREHEN METABOLIC PANEL: CPT | Mod: ORL | Performed by: FAMILY MEDICINE

## 2024-03-14 PROCEDURE — 80177 DRUG SCRN QUAN LEVETIRACETAM: CPT | Mod: ORL | Performed by: FAMILY MEDICINE

## 2024-03-15 LAB
ALBUMIN SERPL BCG-MCNC: 4.5 G/DL (ref 3.5–5.2)
ALP SERPL-CCNC: 68 U/L (ref 40–150)
ALT SERPL W P-5'-P-CCNC: 15 U/L (ref 0–50)
ANION GAP SERPL CALCULATED.3IONS-SCNC: 12 MMOL/L (ref 7–15)
AST SERPL W P-5'-P-CCNC: 17 U/L (ref 0–45)
BILIRUB SERPL-MCNC: 0.2 MG/DL
BUN SERPL-MCNC: 19.7 MG/DL (ref 6–20)
CALCIUM SERPL-MCNC: 9.1 MG/DL (ref 8.6–10)
CHLORIDE SERPL-SCNC: 107 MMOL/L (ref 98–107)
CREAT SERPL-MCNC: 0.83 MG/DL (ref 0.51–0.95)
DEPRECATED HCO3 PLAS-SCNC: 21 MMOL/L (ref 22–29)
EGFRCR SERPLBLD CKD-EPI 2021: 90 ML/MIN/1.73M2
GLUCOSE SERPL-MCNC: 111 MG/DL (ref 70–99)
LEVETIRACETAM SERPL-MCNC: 40.1 ΜG/ML (ref 10–40)
POTASSIUM SERPL-SCNC: 3.7 MMOL/L (ref 3.4–5.3)
PROT SERPL-MCNC: 6.8 G/DL (ref 6.4–8.3)
SODIUM SERPL-SCNC: 140 MMOL/L (ref 135–145)

## 2024-03-23 ENCOUNTER — HEALTH MAINTENANCE LETTER (OUTPATIENT)
Age: 44
End: 2024-03-23

## 2024-10-19 ENCOUNTER — HEALTH MAINTENANCE LETTER (OUTPATIENT)
Age: 44
End: 2024-10-19

## 2025-04-03 ENCOUNTER — LAB REQUISITION (OUTPATIENT)
Dept: LAB | Facility: CLINIC | Age: 45
End: 2025-04-03
Payer: COMMERCIAL

## 2025-04-03 DIAGNOSIS — M25.50 PAIN IN UNSPECIFIED JOINT: ICD-10-CM

## 2025-04-03 DIAGNOSIS — Z20.2 CONTACT WITH AND (SUSPECTED) EXPOSURE TO INFECTIONS WITH A PREDOMINANTLY SEXUAL MODE OF TRANSMISSION: ICD-10-CM

## 2025-04-03 LAB
ERYTHROCYTE [SEDIMENTATION RATE] IN BLOOD BY WESTERGREN METHOD: 12 MM/HR (ref 0–20)
HSV1 IGG SERPL QL IA: 0.4 INDEX
HSV1 IGG SERPL QL IA: ABNORMAL
HSV2 IGG SERPL QL IA: 4.17 INDEX
HSV2 IGG SERPL QL IA: ABNORMAL

## 2025-04-03 PROCEDURE — 86696 HERPES SIMPLEX TYPE 2 TEST: CPT | Mod: ORL | Performed by: FAMILY MEDICINE

## 2025-04-03 PROCEDURE — 85652 RBC SED RATE AUTOMATED: CPT | Mod: ORL | Performed by: FAMILY MEDICINE

## 2025-04-03 PROCEDURE — 87491 CHLMYD TRACH DNA AMP PROBE: CPT | Mod: ORL | Performed by: FAMILY MEDICINE

## 2025-04-03 PROCEDURE — 86038 ANTINUCLEAR ANTIBODIES: CPT | Mod: ORL | Performed by: FAMILY MEDICINE

## 2025-04-04 LAB
ANA SER QL IF: NEGATIVE
C TRACH DNA SPEC QL PROBE+SIG AMP: NEGATIVE
N GONORRHOEA DNA SPEC QL NAA+PROBE: NEGATIVE
SPECIMEN TYPE: NORMAL

## (undated) DEVICE — SNARE OVAL SMALL DISP 100600

## (undated) DEVICE — SUCTION MANIFOLD NEPTUNE 2 SYS 1 PORT 702-025-000

## (undated) DEVICE — ENDO BRUSH BILIARY CYTOLOGY 8FRX200CM M00545000

## (undated) DEVICE — TUBING SUCTION MEDI-VAC 1/4"X20' N620A - HE

## (undated) DEVICE — WIRE GUIDE 0.35X270CM STRAIGHT TIP VISIGLIDE G-260-3527S

## (undated) DEVICE — BALLOON EXTRACTION 15X1950MM 3.2MM TL B-V243Q-A

## (undated) DEVICE — SOL WATER IRRIG 1000ML BOTTLE 2F7114

## (undated) DEVICE — PLATE GROUNDING ADULT W/CORD 9165L